# Patient Record
Sex: FEMALE | Race: WHITE | NOT HISPANIC OR LATINO | Employment: STUDENT | URBAN - METROPOLITAN AREA
[De-identification: names, ages, dates, MRNs, and addresses within clinical notes are randomized per-mention and may not be internally consistent; named-entity substitution may affect disease eponyms.]

---

## 2021-08-27 ENCOUNTER — OFFICE VISIT (OUTPATIENT)
Dept: URGENT CARE | Facility: URGENT CARE | Age: 21
End: 2021-08-27
Payer: COMMERCIAL

## 2021-08-27 VITALS
HEIGHT: 71 IN | RESPIRATION RATE: 14 BRPM | SYSTOLIC BLOOD PRESSURE: 122 MMHG | WEIGHT: 192 LBS | TEMPERATURE: 98.5 F | DIASTOLIC BLOOD PRESSURE: 70 MMHG | HEART RATE: 70 BPM | BODY MASS INDEX: 26.88 KG/M2

## 2021-08-27 DIAGNOSIS — B00.1 COLD SORE: ICD-10-CM

## 2021-08-27 DIAGNOSIS — N34.2 URETHRITIS: ICD-10-CM

## 2021-08-27 DIAGNOSIS — R30.0 DYSURIA: Primary | ICD-10-CM

## 2021-08-27 LAB
ALBUMIN UR-MCNC: 30 MG/DL
APPEARANCE UR: CLEAR
BILIRUB UR QL STRIP: ABNORMAL
COLOR UR AUTO: YELLOW
GLUCOSE UR STRIP-MCNC: NEGATIVE MG/DL
HGB UR QL STRIP: ABNORMAL
KETONES UR STRIP-MCNC: ABNORMAL MG/DL
LEUKOCYTE ESTERASE UR QL STRIP: NEGATIVE
NITRATE UR QL: NEGATIVE
PH UR STRIP: 5 [PH] (ref 5–7)
RBC #/AREA URNS AUTO: NORMAL /HPF
SP GR UR STRIP: >=1.03 (ref 1–1.03)
UROBILINOGEN UR STRIP-ACNC: 0.2 E.U./DL
WBC #/AREA URNS AUTO: NORMAL /HPF

## 2021-08-27 PROCEDURE — 81001 URINALYSIS AUTO W/SCOPE: CPT | Performed by: FAMILY MEDICINE

## 2021-08-27 PROCEDURE — 99203 OFFICE O/P NEW LOW 30 MIN: CPT | Mod: 25 | Performed by: FAMILY MEDICINE

## 2021-08-27 PROCEDURE — 96372 THER/PROPH/DIAG INJ SC/IM: CPT | Performed by: FAMILY MEDICINE

## 2021-08-27 PROCEDURE — 87491 CHLMYD TRACH DNA AMP PROBE: CPT | Performed by: FAMILY MEDICINE

## 2021-08-27 PROCEDURE — 87529 HSV DNA AMP PROBE: CPT | Performed by: FAMILY MEDICINE

## 2021-08-27 PROCEDURE — 87591 N.GONORRHOEAE DNA AMP PROB: CPT | Performed by: FAMILY MEDICINE

## 2021-08-27 RX ORDER — VALACYCLOVIR HYDROCHLORIDE 1 G/1
1000 TABLET, FILM COATED ORAL 2 TIMES DAILY
Qty: 20 TABLET | Refills: 0 | Status: SHIPPED | OUTPATIENT
Start: 2021-08-27 | End: 2022-08-04

## 2021-08-27 RX ORDER — ESTRADIOL 0.1 MG/D
PATCH TRANSDERMAL
COMMUNITY
Start: 2020-10-28 | End: 2022-08-04

## 2021-08-27 RX ORDER — TRAZODONE HYDROCHLORIDE 50 MG/1
TABLET, FILM COATED ORAL
COMMUNITY
Start: 2021-07-29 | End: 2022-08-04

## 2021-08-27 RX ORDER — AZITHROMYCIN 500 MG/1
1000 TABLET, FILM COATED ORAL DAILY
Qty: 2 TABLET | Refills: 0 | Status: SHIPPED | OUTPATIENT
Start: 2021-08-27 | End: 2021-08-28

## 2021-08-27 RX ORDER — DULOXETIN HYDROCHLORIDE 30 MG/1
CAPSULE, DELAYED RELEASE ORAL
COMMUNITY
Start: 2021-08-27 | End: 2022-08-04

## 2021-08-27 ASSESSMENT — MIFFLIN-ST. JEOR: SCORE: 1732.04

## 2021-08-28 NOTE — PATIENT INSTRUCTIONS
Patient Education     Urethritis in Women   Urethritis occurs when the urethra is red and swollen (inflamed). The urethra is the tube that passes urine from the bladder to outside the body. The urethra can get swollen and cause burning pain when you urinate. You may also have pain with sex. It can cause pain in the belly (abdomen) or pelvis. A urethral or vaginal discharge may also occur.      An inflamed urethra can cause pain during urination.   What causes urethritis?  Urethritis can be caused by a bacterial or viral infection. Such an infection can lead to conditions such as a urinary tract infection (UTI) or sexually transmitted infection (STI). Urethritis can also be caused by injury or sensitivity or allergy to chemicals in lotions and other products.   How is urethritis diagnosed?  Your healthcare provider will examine you and ask about your symptoms and health history. You may also have 1 or more of the following tests:     Urine test. Urine samples are taken and checked for problems.    Blood test. A blood sample is taken and checked for problems.    Vaginal culture. A sample of vaginal discharge is taken and tested for problems. A cotton swab is inserted into the vagina.    Cystoscopy. This test lets the provider look for problems in the urinary tract. The test uses a thin, flexible telescope called a cystoscope with a light and camera attached. The scope is put into the urethra.    Ultrasound. This lets the healthcare provider see a detailed image of the inside of your pelvis. Ultrasound will not show if you have urethritis. But it may show other signs of STIs that can also cause urethritis.    Nucleic acid test (NEVIN). This can tell if you have a virus or bacteria. It may be done instead of a culture because it allows for a faster diagnosis.  How is urethritis treated?  Treatment depends on the cause of urethritis. If it s due to a bacterial infection, medicines that fight infection (antibiotics) will  be given. Your healthcare provider can tell you more about your treatment options. In the meantime, your symptoms can be treated. To relieve pain and swelling, anti-inflammatory medicines, such as ibuprofen, may be given. Untreated, symptoms may get worse. It can also cause scar tissue to form in the urethra, causing it to narrow. And it can lead to pelvic inflammatory disease.   When to call your healthcare provider   Call the healthcare provider right away if you have any of the following:     Fever of  100.4  F ( 38.0 C ) or higher, or as directed by your provider    Burning pain with urination    Belly or pelvic pain    Increased urge to urinate    Discharge from the vagina  Preventing STIs  When it comes to sex, it s important to take care and be safe. Any sexual contact with the penis, vagina, anus, or mouth can spread an STI. The only sure way to prevent STIs is not to have sex (abstinence). But there are ways to make sex safer. Use a latex condom each time you have sex. And talk with your partner about STIs before you have sex.   [employer] last reviewed this educational content on 1/1/2020 2000-2021 [employer]. All rights reserved. This information is not intended as a substitute for professional medical care. Always follow your healthcare professional's instructions.

## 2021-08-29 LAB
C TRACH DNA SPEC QL NAA+PROBE: NEGATIVE
HSV1 DNA SPEC QL NAA+PROBE: NOT DETECTED
HSV2 DNA SPEC QL NAA+PROBE: NOT DETECTED
N GONORRHOEA DNA SPEC QL NAA+PROBE: NEGATIVE

## 2021-08-30 NOTE — PROGRESS NOTES
"(R30.0) Dysuria    (N34.2) Urethritis(primary encounter diagnosis)  Comment: concerning for sti chlam/[place]. We discussed options and recommende treatment given risk.   Plan: UA Macro with Reflex to Micro and Culture - lab        collect, Chlamydia trachomatis PCR - Clinic         Collect, Neisseria gonorrhoeae PCR - Clinic         Collect, Urine Microscopic         cefTRIAXone (ROCEPHIN) injection 500 mg,         azithromycin (ZITHROMAX) 500 MG tablet         toelrated well. Aftercare discussed. Recheck in 3 months sooner if symptoms not resolving.     (B00.1) Cold sore  Comment: this appears to be a cold sore v impetigo. Doubt gonorrhea but this would be treated with the ceftriaxone.    Plan: valACYclovir (VALTREX) 1000 mg tablet, HSV         Types 1 and 2 Qualitative PCR CSF        follow up discussed.     ------------------------------------    SUBJECTIVE:   22 yo with lesion on her lip and dysuria x 24 hours.     Possible Recent exposure through oral and vag intercourse with new partner.     No fever. No swollen glands noted. No throat pain. No abd pain.     OBJECTIVE: /70   Pulse 70   Temp 98.5  F (36.9  C) (Oral)   Resp 14   Ht 1.803 m (5' 11\")   Wt 87.1 kg (192 lb)   Breastfeeding No   BMI 26.78 kg/m   no apparent distress   Lower right lip with swelling and brownish discoloration of the skin. Some suspected discrete vesicles.   No lad noted.   Oropharynx is clear otherwise.     Results for orders placed or performed in visit on 08/27/21   UA Macro with Reflex to Micro and Culture - lab collect     Status: Abnormal    Specimen: Urine, Midstream   Result Value Ref Range    Color Urine Yellow Colorless, Straw, Light Yellow, Yellow    Appearance Urine Clear Clear    Glucose Urine Negative Negative mg/dL    Bilirubin Urine Small (A) Negative    Ketones Urine Trace (A) Negative mg/dL    Specific Gravity Urine >=1.030 1.003 - 1.035    Blood Urine Moderate (A) Negative    pH Urine 5.0 5.0 - 7.0    " Protein Albumin Urine 30  (A) Negative mg/dL    Urobilinogen Urine 0.2 0.2, 1.0 E.U./dL    Nitrite Urine Negative Negative    Leukocyte Esterase Urine Negative Negative   Urine Microscopic     Status: Normal   Result Value Ref Range    RBC Urine 0-2 0-2 /HPF /HPF    WBC Urine 0-5 0-5 /HPF /HPF    Narrative    Urine Culture not indicated   Chlamydia trachomatis PCR - Clinic Collect     Status: Normal    Specimen: Vagina; Swab   Result Value Ref Range    Chlamydia trachomatis Negative Negative   Neisseria gonorrhoeae PCR - Clinic Collect     Status: Normal    Specimen: Vagina; Swab   Result Value Ref Range    Neisseria gonorrhoeae Negative Negative   HSV Types 1 and 2 Qualitative PCR CSF     Status: Normal    Specimen: Lip, Lower; Swab   Result Value Ref Range    Herpes Simplex Virus 1 DNA Not Detected Not Detected    Herpes Simplex Virus 2 DNA Not Detected Not Detected    Narrative    The [employer] Molecular Simplexa HSV 1 & 2 Direct assay on the [employer] MDX instrument is a FDA-approved, real-time PCR test for the qualitative detection and differentiation of Herpes Simplex virus Type 1 & 2 DNA from patients with signs and symptoms of HSV-1 or 2 infection.

## 2021-10-09 ENCOUNTER — HEALTH MAINTENANCE LETTER (OUTPATIENT)
Age: 21
End: 2021-10-09

## 2022-03-09 ENCOUNTER — VIRTUAL VISIT (OUTPATIENT)
Dept: URGENT CARE | Facility: CLINIC | Age: 22
End: 2022-03-09
Payer: COMMERCIAL

## 2022-03-09 DIAGNOSIS — J06.9 VIRAL UPPER RESPIRATORY TRACT INFECTION: Primary | ICD-10-CM

## 2022-03-09 PROCEDURE — 99213 OFFICE O/P EST LOW 20 MIN: CPT | Mod: 95 | Performed by: PHYSICIAN ASSISTANT

## 2022-03-09 NOTE — PROGRESS NOTES
"Maria Antonia is a 22 year old who is being evaluated via a billable video visit.      Video Start Time: 3:33 PM    Assessment & Plan     Viral upper respiratory tract infection    BMI:   Estimated body mass index is 26.78 kg/m  as calculated from the following:    Height as of 8/27/21: 1.803 m (5' 11\").    Weight as of 8/27/21: 87.1 kg (192 lb).     I will have patient treat with sudafed, Mucinex and ibuprofen and follow up if symptoms are persisting or worsening over the next 5-7 days.     Kirstie Tolliver PA-C  Virtual Urgent Care  [facility]    Melva Em is a 22 year old who presents for the following health issues : cold    HPI - Patient developed cold symptoms on Monday that have persisted. She is having sinus pressure a cough and a runny nose. She took a covid test and it was negative. She has not had fevers or chills. She has not had body aches or other acute symptoms.     Review of Systems   Constitutional, HEENT, cardiovascular, pulmonary, gi and gu systems are negative, except as otherwise noted.      Objective           Vitals:  No vitals were obtained today due to virtual visit.    Physical Exam   GENERAL: alert and no distress  EYES: Eyes grossly normal to inspection.  No discharge or erythema, or obvious scleral/conjunctival abnormalities.  HENT: normal cephalic/atraumatic, rhinorrhea clear, oropharynx clear, oral mucous membranes moist and sinuses: not tender but congested per patient  RESP: No audible wheeze, cough, or visible cyanosis.  No visible retractions or increased work of breathing.    SKIN: Visible skin clear. No significant rash, abnormal pigmentation or lesions.  NEURO: Cranial nerves grossly intact.  Mentation and speech appropriate for age.  PSYCH: Mentation appears normal, affect normal/bright, judgement and insight intact, normal speech and appearance well-groomed.        Video-Visit Details    Type of service:  Video Visit    Video End Time:3:37 " PM    Originating Location (pt. Location): Home    Distant Location (provider location):  [facility]     Platform used for Video Visit: Sumanth

## 2022-08-04 ENCOUNTER — VIRTUAL VISIT (OUTPATIENT)
Dept: FAMILY MEDICINE | Facility: CLINIC | Age: 22
End: 2022-08-04
Payer: COMMERCIAL

## 2022-08-04 DIAGNOSIS — R19.7 DIARRHEA, UNSPECIFIED TYPE: Primary | ICD-10-CM

## 2022-08-04 DIAGNOSIS — F41.1 GAD (GENERALIZED ANXIETY DISORDER): ICD-10-CM

## 2022-08-04 DIAGNOSIS — F33.1 MODERATE EPISODE OF RECURRENT MAJOR DEPRESSIVE DISORDER (H): ICD-10-CM

## 2022-08-04 DIAGNOSIS — R14.0 ABDOMINAL BLOATING: ICD-10-CM

## 2022-08-04 PROCEDURE — 99203 OFFICE O/P NEW LOW 30 MIN: CPT | Mod: 95 | Performed by: INTERNAL MEDICINE

## 2022-08-04 RX ORDER — DULOXETIN HYDROCHLORIDE 60 MG/1
60 CAPSULE, DELAYED RELEASE ORAL DAILY
Qty: 60 CAPSULE | Refills: 1 | Status: SHIPPED | OUTPATIENT
Start: 2022-08-04 | End: 2022-09-12

## 2022-08-04 NOTE — PATIENT INSTRUCTIONS
[phone] for lab appointment.    Blood work ordered.     Reading material for bloating. Avoid all dairy products.     Probiotics - whole foods    Stomach doctor referral     Mental health referral

## 2022-08-04 NOTE — PROGRESS NOTES
Maria Antonia is a 22 year old who is being evaluated via a billable video visit.      How would you like to obtain your AVS? MyChart  If the video visit is dropped, the invitation should be resent by: Text to cell phone: [phone]  Will anyone else be joining your video visit? No          Assessment & Plan     Diarrhea, unspecified type  Restrict dairy, other foods that cause bloating, start taking probiotics. Refer to GI. She is concerned about Celiac disease, testing ordered. Other basic lab work ordered.   - CBC with Platelets (Today); Future  - COMPREHENSIVE METABOLIC PANEL; Future  - IgA [LAB73]; Future  - Deamidated Giladin Peptide Agata IgA IgG [HML0651]; Future  - Tissue transglutaminase agata IgA and IgG [YIK4184]; Future  - Endomysial Antibody IgA by IFA [[id number]]; Future  - Adult GI  Referral - Consult Only; Future    Abdominal bloating  - Adult GI  Referral - Consult Only; Future    Moderate episode of recurrent major depressive disorder (H)  Resume Cymbalta 60 mg daily. Will follow up in 6 weeks.  - Adult Mental Health  Referral; Future  - DULoxetine (CYMBALTA) 60 MG capsule; Take 1 capsule (60 mg) by mouth daily    TRISHA (generalized anxiety disorder)  - Adult Mental Health  Referral; Future  - DULoxetine (CYMBALTA) 60 MG capsule; Take 1 capsule (60 mg) by mouth daily    See Patient Instructions    Return in about 4 weeks (around 9/1/2022) for Follow up, with me.    MARYANNE ESPINOZA MD  [facility]    Melva Em is a 22 year old, presenting for the following health issues:  No chief complaint on file.    Constipation, bloating, diarrhea, irregular bowel movement, acne on chin and back, migraine and brain fog. Irregular periods. She had IUD in the past, recently removed. She goes to [facility].     Sister and cousin allergic to fructose   Cousin has Celiac disease  Sister has ORACIO    She has depression and anxiety. She was on cymbalta in the past which  worked for her and would like to go back on it.       History of Present Illness       Reason for visit:  New primary provider    She eats 4 or more servings of fruits and vegetables daily.She consumes 0 sweetened beverage(s) daily.She exercises with enough effort to increase her heart rate 30 to 60 minutes per day.  She exercises with enough effort to increase her heart rate 6 days per week.   She is taking medications regularly.       Review of Systems       Objective       Vitals:  No vitals were obtained today due to virtual visit.    Physical Exam   GEN: No acute distress  RESP: No audible increased work of breathing. Patient speaking in full sentences without distress.  PSYCH: pleasant  Exam otherwise limited due to virtual platform        Video-Visit Details    Video Start Time: 2:30 pm    Type of service:  Video Visit    Video End Time:2:49 pm    Originating Location (pt. Location): Home    Distant Location (provider location):  [facility]     Platform used for Video Visit: [employer]  Margaret.

## 2022-08-11 ENCOUNTER — LAB (OUTPATIENT)
Dept: LAB | Facility: CLINIC | Age: 22
End: 2022-08-11
Payer: COMMERCIAL

## 2022-08-11 DIAGNOSIS — R19.7 DIARRHEA, UNSPECIFIED TYPE: ICD-10-CM

## 2022-08-11 LAB
ALBUMIN SERPL-MCNC: 4.5 G/DL (ref 3.4–5)
ALP SERPL-CCNC: 45 U/L (ref 40–150)
ALT SERPL W P-5'-P-CCNC: 16 U/L (ref 0–50)
ANION GAP SERPL CALCULATED.3IONS-SCNC: 7 MMOL/L (ref 3–14)
AST SERPL W P-5'-P-CCNC: 15 U/L (ref 0–45)
BILIRUB SERPL-MCNC: 1.2 MG/DL (ref 0.2–1.3)
BUN SERPL-MCNC: 17 MG/DL (ref 7–30)
CALCIUM SERPL-MCNC: 9 MG/DL (ref 8.5–10.1)
CHLORIDE BLD-SCNC: 106 MMOL/L (ref 94–109)
CO2 SERPL-SCNC: 25 MMOL/L (ref 20–32)
CREAT SERPL-MCNC: 0.87 MG/DL (ref 0.52–1.04)
ERYTHROCYTE [DISTWIDTH] IN BLOOD BY AUTOMATED COUNT: 11.6 % (ref 10–15)
GFR SERPL CREATININE-BSD FRML MDRD: >90 ML/MIN/1.73M2
GLIADIN IGA SER-ACNC: 0.5 U/ML
GLIADIN IGG SER-ACNC: <0.6 U/ML
GLUCOSE BLD-MCNC: 91 MG/DL (ref 70–99)
HCT VFR BLD AUTO: 41.7 % (ref 35–47)
HGB BLD-MCNC: 14.3 G/DL (ref 11.7–15.7)
MCH RBC QN AUTO: 29.2 PG (ref 26.5–33)
MCHC RBC AUTO-ENTMCNC: 34.3 G/DL (ref 31.5–36.5)
MCV RBC AUTO: 85 FL (ref 78–100)
PLATELET # BLD AUTO: 225 10E3/UL (ref 150–450)
POTASSIUM BLD-SCNC: 4 MMOL/L (ref 3.4–5.3)
PROT SERPL-MCNC: 7.2 G/DL (ref 6.8–8.8)
RBC # BLD AUTO: 4.9 10E6/UL (ref 3.8–5.2)
SODIUM SERPL-SCNC: 138 MMOL/L (ref 133–144)
TTG IGA SER-ACNC: <0.2 U/ML
TTG IGG SER-ACNC: <0.6 U/ML
WBC # BLD AUTO: 5.1 10E3/UL (ref 4–11)

## 2022-08-11 PROCEDURE — 86231 EMA EACH IG CLASS: CPT | Mod: 90

## 2022-08-11 PROCEDURE — 85027 COMPLETE CBC AUTOMATED: CPT

## 2022-08-11 PROCEDURE — 86258 DGP ANTIBODY EACH IG CLASS: CPT

## 2022-08-11 PROCEDURE — 86364 TISS TRNSGLTMNASE EA IG CLAS: CPT

## 2022-08-11 PROCEDURE — 99000 SPECIMEN HANDLING OFFICE-LAB: CPT

## 2022-08-11 PROCEDURE — 36415 COLL VENOUS BLD VENIPUNCTURE: CPT

## 2022-08-11 PROCEDURE — 80053 COMPREHEN METABOLIC PANEL: CPT

## 2022-08-11 PROCEDURE — 82784 ASSAY IGA/IGD/IGG/IGM EACH: CPT

## 2022-08-12 LAB
ENDOMYSIUM IGA TITR SER IF: NORMAL {TITER}
IGA SERPL-MCNC: 101 MG/DL (ref 84–499)

## 2022-09-12 ENCOUNTER — VIRTUAL VISIT (OUTPATIENT)
Dept: FAMILY MEDICINE | Facility: CLINIC | Age: 22
End: 2022-09-12
Payer: COMMERCIAL

## 2022-09-12 DIAGNOSIS — F33.1 MODERATE EPISODE OF RECURRENT MAJOR DEPRESSIVE DISORDER (H): Primary | ICD-10-CM

## 2022-09-12 DIAGNOSIS — F41.1 GAD (GENERALIZED ANXIETY DISORDER): ICD-10-CM

## 2022-09-12 PROCEDURE — 99213 OFFICE O/P EST LOW 20 MIN: CPT | Mod: 95 | Performed by: INTERNAL MEDICINE

## 2022-09-12 RX ORDER — SERTRALINE HYDROCHLORIDE 25 MG/1
25 TABLET, FILM COATED ORAL DAILY
Qty: 60 TABLET | Refills: 0 | Status: SHIPPED | OUTPATIENT
Start: 2022-09-12 | End: 2022-10-12

## 2022-09-12 ASSESSMENT — ANXIETY QUESTIONNAIRES
2. NOT BEING ABLE TO STOP OR CONTROL WORRYING: NOT AT ALL
GAD7 TOTAL SCORE: 2
7. FEELING AFRAID AS IF SOMETHING AWFUL MIGHT HAPPEN: NOT AT ALL
8. IF YOU CHECKED OFF ANY PROBLEMS, HOW DIFFICULT HAVE THESE MADE IT FOR YOU TO DO YOUR WORK, TAKE CARE OF THINGS AT HOME, OR GET ALONG WITH OTHER PEOPLE?: SOMEWHAT DIFFICULT
4. TROUBLE RELAXING: NOT AT ALL
7. FEELING AFRAID AS IF SOMETHING AWFUL MIGHT HAPPEN: NOT AT ALL
GAD7 TOTAL SCORE: 2
3. WORRYING TOO MUCH ABOUT DIFFERENT THINGS: NOT AT ALL
IF YOU CHECKED OFF ANY PROBLEMS ON THIS QUESTIONNAIRE, HOW DIFFICULT HAVE THESE PROBLEMS MADE IT FOR YOU TO DO YOUR WORK, TAKE CARE OF THINGS AT HOME, OR GET ALONG WITH OTHER PEOPLE: SOMEWHAT DIFFICULT
1. FEELING NERVOUS, ANXIOUS, OR ON EDGE: SEVERAL DAYS
5. BEING SO RESTLESS THAT IT IS HARD TO SIT STILL: NOT AT ALL
6. BECOMING EASILY ANNOYED OR IRRITABLE: SEVERAL DAYS
GAD7 TOTAL SCORE: 2

## 2022-09-12 ASSESSMENT — PATIENT HEALTH QUESTIONNAIRE - PHQ9
SUM OF ALL RESPONSES TO PHQ QUESTIONS 1-9: 5
SUM OF ALL RESPONSES TO PHQ QUESTIONS 1-9: 5
10. IF YOU CHECKED OFF ANY PROBLEMS, HOW DIFFICULT HAVE THESE PROBLEMS MADE IT FOR YOU TO DO YOUR WORK, TAKE CARE OF THINGS AT HOME, OR GET ALONG WITH OTHER PEOPLE: SOMEWHAT DIFFICULT

## 2022-09-12 NOTE — PROGRESS NOTES
Maria Antonia is a 22 year old who is being evaluated via a billable video visit.      How would you like to obtain your AVS? MyChart  If the video visit is dropped, the invitation should be resent by: Text to cell phone: [phone]  Will anyone else be joining your video visit? No          Assessment & Plan     Moderate episode of recurrent major depressive disorder (H)  Stable. Start zoloft 25 mg daily.   Discontinue duloxetine  - sertraline (ZOLOFT) 25 MG tablet; Take 1 tablet (25 mg) by mouth daily    TRISHA (generalized anxiety disorder)  - sertraline (ZOLOFT) 25 MG tablet; Take 1 tablet (25 mg) by mouth daily     See Patient Instructions    Return in about 4 weeks (around 10/10/2022) for Follow up, with me, using a phone visit, using a video visit.    MARYANNE ESPINOZA MD  [facility]   Maria Antonia is a 22 year old, presenting for the following health issues:  Recheck Medication (Cymbalta medication follow up)    Maria Antonia was started on Duloxetine 60 mg daily 2 months ago for MDD and TRISHA.   She had taken this medication in the past and wanted to resume it.    Today she reports side effects including fatigue and nightmares.   She wants to stop this medication.   She has tried Lexapro in the past but not able to tolerate it.  She denies suicidal ideas.     In the last visit she also had diarrhea which has now resolved after restricting dairy.   She had constipation and took senna.        History of Present Illness       Mental Health Follow-up:  Patient presents to follow-up on Depression & Anxiety.Patient's depression since last visit has been:  Medium  The patient is not having other symptoms associated with depression.  Patient's anxiety since last visit has been:  Medium  The patient is not having other symptoms associated with anxiety.  Any significant life events: No  Patient is not feeling anxious or having panic attacks.  Patient has no concerns about alcohol or drug use.    She eats 2-3  "servings of fruits and vegetables daily.She consumes 0 sweetened beverage(s) daily.She exercises with enough effort to increase her heart rate 10 to 19 minutes per day.  She exercises with enough effort to increase her heart rate 5 days per week. She is missing 2 dose(s) of medications per week.  She is not taking prescribed medications regularly due to remembering to take.    Today's PHQ-9         PHQ-9 Total Score: 5    PHQ-9 Q9 Thoughts of better off dead/self-harm past 2 weeks :   Not at all    How difficult have these problems made it for you to do your work, take care of things at home, or get along with other people: Somewhat difficult  Today's TRISHA-7 Score: 2       Medication Followup of Cymbalta    Taking Medication as prescribed: yes    Side Effects:  Trouble sleeping through the night    Medication Helping Symptoms:  NO        Review of Systems       Objective    Vitals - Patient Reported  Weight (Patient Reported): 86.2 kg (190 lb)  Height (Patient Reported): 180.3 cm (5' 11\")  BMI (Based on Pt Reported Ht/Wt): 26.5    Physical Exam           Video-Visit Details    Video Start Time: 11:50 am    Type of service:  Video Visit    Video End Time:12:01 pm    Originating Location (pt. Location): Home    Distant Location (provider location):  [facility]     Platform used for Video Visit: Sumanth    "

## 2022-09-17 ENCOUNTER — HEALTH MAINTENANCE LETTER (OUTPATIENT)
Age: 22
End: 2022-09-17

## 2022-09-26 NOTE — TELEPHONE ENCOUNTER
REFERRAL INFORMATION:    Referring Provider: Dr. Francisca Mendez     Referring [facility]:  [facility]     Reason for Visit/Diagnosis: Diarrhea , Abdominal bloating     FUTURE VISIT INFORMATION:    Appointment Date: 10/4/2022    Appointment Time: 1 PM      NOTES STATUS DETAILS   OFFICE NOTE from Referring Provider Internal 9/12/2022, 8/4/2022 Office visit with Dr. Mendze      OFFICE NOTE from Other Specialist N/A    HOSPITAL DISCHARGE SUMMARY/  ED VISITS N/A    OPERATIVE REPORT N/A    MEDICATION LIST Internal         ENDOSCOPY  N/A    COLONOSCOPY N/A    ERCP N/A    EUS N/A    STOOL TESTING N/A    PERTINENT LABS Internal/ Care Everywhere    PATHOLOGY REPORTS (RELATED) N/A    IMAGING (CT, MRI, EGD, MRCP, Small Bowel Follow Through/SBT, MR/CT Enterography) N/A

## 2022-10-04 ENCOUNTER — PRE VISIT (OUTPATIENT)
Dept: GASTROENTEROLOGY | Facility: CLINIC | Age: 22
End: 2022-10-04

## 2022-10-12 ENCOUNTER — VIRTUAL VISIT (OUTPATIENT)
Dept: BEHAVIORAL HEALTH | Facility: CLINIC | Age: 22
End: 2022-10-12
Payer: COMMERCIAL

## 2022-10-12 ENCOUNTER — VIRTUAL VISIT (OUTPATIENT)
Dept: PSYCHIATRY | Facility: CLINIC | Age: 22
End: 2022-10-12
Attending: INTERNAL MEDICINE
Payer: COMMERCIAL

## 2022-10-12 DIAGNOSIS — F41.1 GAD (GENERALIZED ANXIETY DISORDER): ICD-10-CM

## 2022-10-12 DIAGNOSIS — F32.4 MAJOR DEPRESSIVE DISORDER WITH SINGLE EPISODE, IN PARTIAL REMISSION (H): ICD-10-CM

## 2022-10-12 DIAGNOSIS — F33.1 MODERATE EPISODE OF RECURRENT MAJOR DEPRESSIVE DISORDER (H): ICD-10-CM

## 2022-10-12 DIAGNOSIS — F41.1 GAD (GENERALIZED ANXIETY DISORDER): Primary | ICD-10-CM

## 2022-10-12 PROCEDURE — 90791 PSYCH DIAGNOSTIC EVALUATION: CPT | Mod: 52

## 2022-10-12 PROCEDURE — 99205 OFFICE O/P NEW HI 60 MIN: CPT | Mod: 95 | Performed by: NURSE PRACTITIONER

## 2022-10-12 RX ORDER — SERTRALINE HYDROCHLORIDE 25 MG/1
50 TABLET, FILM COATED ORAL DAILY
Qty: 30 TABLET | Refills: 0 | Status: SHIPPED | OUTPATIENT
Start: 2022-10-12 | End: 2022-11-15

## 2022-10-12 ASSESSMENT — COLUMBIA-SUICIDE SEVERITY RATING SCALE - C-SSRS
2. HAVE YOU ACTUALLY HAD ANY THOUGHTS OF KILLING YOURSELF?: NO
1. HAVE YOU WISHED YOU WERE DEAD OR WISHED YOU COULD GO TO SLEEP AND NOT WAKE UP?: NO
TOTAL  NUMBER OF INTERRUPTED ATTEMPTS LIFETIME: NO
6. HAVE YOU EVER DONE ANYTHING, STARTED TO DO ANYTHING, OR PREPARED TO DO ANYTHING TO END YOUR LIFE?: NO
ATTEMPT LIFETIME: NO
TOTAL  NUMBER OF ABORTED OR SELF INTERRUPTED ATTEMPTS LIFETIME: NO

## 2022-10-12 NOTE — PROGRESS NOTES
PSYCHIATRIC DIAGNOSTIC ASSESSMENT      Name:  Maria Antonia Marr  : 2000    Maria Antonia is a 22 year old who is being evaluated via a billable video visit.      How would you like to obtain your AVS? MyChart  If the video visit is dropped, the invitation should be resent by: Text to cell phone: [phone]  Will anyone else be joining your video visit? No     Telemedicine Visit: The patient's condition can be safely assessed and treated via synchronous audio and visual telemedicine encounter.      Reason for Telemedicine Visit: COVID 19 pandemic and the social and physical recommendations by the CDC and [facility].      Originating Site (Patient Location): Patient's home    Distant Site (Provider Location): Provider Remote Setting    Consent:  The patient/guardian has verbally consented to: the potential risks and benefits of telemedicine (video visit or phone) versus in person care; bill my insurance or make self-payment for services provided; and responsibility for payment of non-covered services.     Mode of Communication:  Hired video platform     As the provider I attest to compliance with applicable laws and regulations related to telemedicine.    IDENTIFICATION   Referred by: Maryanne Mendez MD [facility]   Patient Care Team:  Maryanne Mendez MD as PCP - General (Internal Medicine)  Maryanne Mendez MD as Assigned PCP  Jitendra Greene MD as Fellow  Therapist: currently [facility]    History was provided by patient  who were  good historian(s).    Patient attended the session alone    RECORDS AVAILABLE FOR REVIEW: EHR records through [employer] .  In addition, reviewed the assessment today completed by Janis Childers, BEHAVIORAL HEALTH CONSULTANT                                                CHIEF COMPLAINT   Patient is a 22 year old,  White Not  or  female  who presents for initial psychiatric evaluation. Referred by   their Primary Care Provider: MARYANNE MENDEZ MD to the [facility]  "[facility] Service ([facility]) for evaluation of depression and anxiety.  Our psychiatry providers act as a specialty service for Primary Care Providers in the [facility] who seek to optimize medications for unstable patients.  Once medications have been optimized, our providers discharge the patient back to the referring Primary Care Provider for ongoing medication management.  This type of system allows our providers to serve a high volume of patients.      Note by PCP day of referral:  \"depression andf anxiety\"    HISTORY OF PRESENT ILLNESS   Per [facility], Janis Childers, during today's team-based visit:  \"Stress: School and work. 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 Work at [employer], as a . Working full-time. Feel like work load and school are affecting mental health symptoms. Concerned that medications are not effective at this time. Taking sertraline currently, have been on for a month, reports that she was supposed to increase dose, but is experiencing migraines. Has previously tried lexapro, duloxetine. Pt endorses having GI distress that she is seeing primary-care and referral to GI specialist.  Goal: Symptom reduction including addressing lack of motivation and negative self-talk. Increase in energy. Reports feeling fatigued and would like address sleep.  Most important: Addressing medication concerns and possible side effects (headaches). Would like to be on as little medication as possible.\"    Reports past diagnosis of the following in July 2021 psych evaluation with [facility] with a diagnosis of Generalized Anxiety Disorder, Major Depressive Disorder, unspecified eating disorder.  First sought treatment 2018 and does note depression is worse in the winter, seasonal component.    Pt endorses that she has experienced depression and anxiety symptoms since the start of college. Pt endorses that her school and work load are affecting her mental " health and exacerbating symptoms.       PSYCHIATRIC HISTORY:   Previous psychiatry: denies   Previous therapist: currently  History of Psychiatric Hospitalizations:   - Inpatient: denies    - IOP/PHP/Day treatment: denies    History of Suicidal Ideation: denies   History of Suicide Attempts:  Denies     History of Self-injurious Behavior: denies   History of Violence/Aggression: denies    History of Commitment?  Denies    Electroconvulsive Therapy (ECT) or Transcranial Magnetic Stimulation (TMS): denies    PharmacogenomicTesting (such as GeneSight): denies     PSYCHIATRIC REVIEW OF SYSTEMS:   Sleep: getting approximately 8 hours of sleep, nonrestorative       Depression:    Change in sleep, Lack of interest, Change in energy level and Feeling sad, down, or depressed  Es:        No Symptoms  Psychosis:    No Symptoms  Anxiety:    Excessive worry, Nervousness, Physical complaints, such as headaches, stomachaches, muscle tension, Sleep disturbance, Poor concentration and Irritability  Panic:        No symptoms  Post Traumatic Stress Disorder:  Denies experiencing or witnessing an event considered traumatic.    Eating Disorder:    Restriction and purging.  This is improved after outpatient treatment at private practice in [place].  Working on body image in therapy.  ADD / ADHD:        No symptoms  Conduct Disorder:    No symptoms  Autism Spectrum Disorder:    No symptoms  Obsessive Compulsive Disorder:    No Symptoms    ASSESSMENT SCALES:  PHQ-9 SCORE 9/12/2022   PHQ-9 Total Score MyChart 5 (Mild depression)   PHQ-9 Total Score 5       Last PHQ-9 9/12/2022   1.  Little interest or pleasure in doing things 1   2.  Feeling down, depressed, or hopeless 1   3.  Trouble falling or staying asleep, or sleeping too much 0   4.  Feeling tired or having little energy 1   5.  Poor appetite or overeating 1   6.  Feeling bad about yourself 0   7.  Trouble concentrating 1   8.  Moving slowly or restless 0   Q9: Thoughts of  better off dead/self-harm past 2 weeks 0   PHQ-9 Total Score 5     PHQ9 score is 5 indicating minimal depression.  Suicidal ideation:  Denies    TRISHA-7 SCORE 9/12/2022   Total Score 2 (minimal anxiety)   Total Score 2     TRISHA-7   [employer], 2002; Used with Permission) 9/12/2022   1. Feeling nervous, anxious, or on edge Several days   2. Not being able to stop or control worrying Not at all   3. Worrying too much about different things Not at all   4. Trouble relaxing Not at all   5. Being so restless that it is hard to sit still Not at all   6. Becoming easily annoyed or irritable Several days   7. Feeling afraid, as if something awful might happen Not at all   TRISHA 7 TOTAL SCORE 2 (minimal anxiety)   1. Feeling nervous, anxious, or on edge 1   2. Not being able to stop or control worrying 0   3. Worrying too much about different things 0   4. Trouble relaxing 0   5. Being so restless that it is hard to sit still 0   6. Becoming easily annoyed or irritable 1   7. Feeling afraid, as if something awful might happen 0   TRISHA-7 Total Score 2   If you checked any problems, how difficult have they made it for you to do your work, take care of things at home, or get along with other people? Somewhat difficult     GAD7 score is 2    A 12-item WHODAS 2.0 assessment was completed by the patient today and recorded in [employer].  No flowsheet data found.    All other ROS negative.     FAMILY, MEDICAL, SURGICAL HISTORY REVIEWED.  MEDICATION HAVE BEEN REVIEWED AND ARE CURRENT TO THE BEST OF MY KNOWLEDGE AND ABILITY.  Student at [employer], , Studying counseling psychology at [employer] and reports being a full-time student. Pt also endorses have a full-time job at [employer], as a      [employer],     MEDICATIONS                                                                                                Current Outpatient Medications   Medication Sig     sertraline  "(ZOLOFT) 25 MG tablet Take 1 tablet (25 mg) by mouth daily     No current facility-administered medications for this visit.       DRUG MONITORING:  [place] Prescription Monitoring Program evaluating controlled substances in the last year in [place]:  [place] review was not needed today..    CURRENT MEDICATION SIDE EFFECTS REPORTED:  See below    NOTES ABOUT CURRENT PSYCHOTROPIC MEDICATIONS:   Sertraline 25 mg, migraines more often,      PAST PSYCHOTROPIC MEDICATIONS:  Duloxetine, restless sleep, vivid dreams  Escitalopram, not effective  Trazodone   Hydroxyzine, \"zombie\"    VITALS   There were no vitals taken for this visit.     BP Readings from Last 1 Encounters:   08/27/21 122/70     Pulse Readings from Last 1 Encounters:   08/27/21 70     Wt Readings from Last 1 Encounters:   08/27/21 87.1 kg (192 lb)     Ht Readings from Last 1 Encounters:   08/27/21 1.803 m (5' 11\")     Estimated body mass index is 26.78 kg/m  as calculated from the following:    Height as of 8/27/21: 1.803 m (5' 11\").    Weight as of 8/27/21: 87.1 kg (192 lb).      PERTINENT HISTORY     Patient Active Problem List   Diagnosis     Diarrhea, unspecified type     Abdominal bloating     Moderate episode of recurrent major depressive disorder (H)     TRISHA (generalized anxiety disorder)        Seizures or Head Injury: Denies history of head injury. Denies history of seizures.     History reviewed. No pertinent surgical history.     SOCIAL HISTORY       Relationship status: single  Children: denies   Highest education level was some college.    Service: denies   Employment status: full time     Trauma history: Denies  ACES (Adverse Childhood Experiences):  None.  Grew up in an intact home with all basic needs being met       LEGAL:  Denies      SUBSTANCE USE HISTORY  Social History     Tobacco Use     Smoking status: Never     Smokeless tobacco: Never   Substance Use Topics     Alcohol use: Yes     Comment: 1-2 " drinks per week       Caffeine:  Unremarkable   Alcohol:  socially  Other substance use: denies   Past use alcohol/substance use: denies      Chemical dependency history: Patient has not received chemical dependency treatment in the past       Family History   Problem Relation Age of Onset     Hypertension Mother      Maturity Onset Diabetes Mellitus in Young (ORACIO) Sister             PERTINENT FAMILY PSYCHIATRIC HISTORY NOTES  sister has severe anxiety, Mother has undiagnosed depression and anxiety. Maternal grandparents also have undiagnosed depression and anxiety. Concerned grandfather may have OCD. Paternal grandparents have hx of alcohol abuse. Paternal grandmother was hospitalized for bipolar disorder when father was a child.       Based on the clinical interview, there  are not indications of drug or alcohol abuse.      LABS & IMAGING                                                                                                                   Recent Labs   Lab Test 08/11/22  0905   WBC 5.1   HGB 14.3   HCT 41.7   MCV 85        Recent Labs   Lab Test 08/11/22  0905      POTASSIUM 4.0   CHLORIDE 106   CO2 25   GLC 91   NURY 9.0   BUN 17   CR 0.87   GFRESTIMATED >90   ALBUMIN 4.5   PROTTOTAL 7.2   AST 15   ALT 16   ALKPHOS 45   BILITOTAL 1.2     No lab results found.  No lab results found.  No results found for: SGK863, PNMV883, MCSO18VDUWN, VITD3, D2VIT, D3VIT, DTOT, [id number], [id number], [id number], [id number], [id number], [id number]     ALLERGY & IMMUNIZATIONS     No Known Allergies        MEDICAL REVIEW OF SYSTEMS:   Ten system review was completed with pertinent positives noted above    MENTAL STATUS EXAM:   General/Constitutional:  Appearance:   awake, alert, adequately groomed, appeared stated age and no apparent distress  Attitude:    cooperative   Eye Contact:  good  Musculoskeletal:  Psychomotor Behavior:  no evidence of tardive dyskinesia, dystonia, or tics from the head  up  Psychiatric:  Speech:  clear, coherent, regular rate, rhythm, and volume,   No pressure speech noted.  Associations:  no loose associations  Thought Process:   logical, linear and goal oriented  Thought Content:    No evidence of suicidal ideation or homicidal ideation, no evidence of psychotic thought, no auditory hallucinations present and no visual hallucinations present  Mood:  angry and good  Affect:  full range/stable (normal variation of emotions during exam) and was congruent to speech content.  Insight:  good  Judgment:  intact, adequate for safety  Impulse Control:  intact  Neurological:  Oriented to:  person, place, time, and situation  Attention Span and Concentration:  Able to attend to the interview      Language: intact     Recent and Remote Memory:  Intact to interview. Not formally assessed. No amnesia.    Fund of Knowledge: appropriate       SAFETY   Feels safe in home: YES     Suicidal ideation: Denies  History of suicide attempts:  No   Hx of impulsivity: No   Hope for the future: present    Hx of Command hallucinations or current psychosis: None endorsed    History of Self-injurious behaviors:    Family member  by suicide:  not discussed      SAFETY ASSESSMENT:   Based on all available evidence including the factors cited above, overall Risk for harm is low and is appropriate for outpatient level of care.   Recommended that patient call 911 or go to the local ED should there be a change in any of these risk factors.         LANGUAGE OR COMMUNICATION BARRIERS   Are there language or communication issues or need for modification in treatment? NO     Are there ethnic, cultural or Episcopalian factors that may be relevant for therapy? NO      Client identified their preferred language to be fluent English in conversational context  Does the client need the assistance of an  or other support involved in therapy? NO        DSM 5 DIAGNOSIS:   296.32 (F33.1) Major Depressive Disorder,  Recurrent Episode, Moderate _  300.02 (F41.1) Generalized Anxiety Disorder      MEDICAL COMORBIDITY IMPACTING CLINICAL PICTURE:  Denies .      ASSESSMENT AND PLAN    Maria Antonia Marr is a 22 year old White Not  or  female presenting for psychiatric evaluation and medication management through the Collaborative Care Psychiatry Services.  Information is obtained from patient and available records.  Reports history of depression, anxiety and unspecified eating disorder (restricting and purging).  Denies prior psychiatric hospitalizations. No history of suicidal thoughts or attempts. No history of self-injurious behaviors. Genetically loaded for  depression, anxiety and remote history of bipolar. Grew up in an intact home with all basic needs being met.       Problem List Items Addressed This Visit        Behavioral     Many medication trials.  Currently on low dose of sertraline and possible increase in headaches.  She would like to continue current dose after we discussed utilizing [employer], a neuropsychiatric pharmacogenomics and pharmacokinetics test to use in clinical decision making. Directed to call customer service to evaluate the financial responsibility they may have at [phone] or [email or web address].   Order placed via their portal to send the kit to the patient should they decide to go forward.    [email or web address]    Since a seasonal component, Look at SAD lamp next visit    Follow-up 4 weeks          Moderate episode of recurrent major depressive disorder (H)    Relevant Medications    sertraline (ZOLOFT) 25 MG tablet    TRISHA (generalized anxiety disorder)    Relevant Medications    sertraline (ZOLOFT) 25 MG tablet          CONSULTS/REFERRALS:   Continue therapy   Coordinate care with therapist as needed    MEDICAL:   None at this time  Coordinate care with PCP (Francisca Mendez) as needed  Follow up with primary care provider as planned or for acute  medical concerns.    PSYCHOEDUCATION:  Medication side effects and alternatives reviewed. Health promotion activities recommended and reviewed today. All questions addressed. Education and counseling completed regarding risks and benefits of medications and psychotherapy options.  Consent provided by patient/guardian  Call the psychiatric nurse line with medication questions or concerns at [phone].  VideoIQhart may be used to communicate with your provider, but this is not intended to be used for emergencies.  [employer]ios[employer].gov is information for patients.  It is run by the [employer] of [employer] and it contains information about all disorders, diseases and all medications.      COMMUNITY RESOURCES:    CRISIS NUMBERS: Provided in AVS 10/12/2022  National Suicide Prevention Lifeline: 0-982-953-TALK (555-969-4949)  [employer]/resources for a list of additional resources (SOS)            [facility] - [phone]   Urgent Care Adult Mental Zcjazt-[phone] mobile unit/  crisis line  [facility] -[phone]   COPE  [place] Mobile Team -[phone] (adults)/ [phone] (child)  Poison Control Center - 1-588.508.8796    OR  go to nearest ER  Crisis Text Line for any crisis  send this-   To: 510096   [facility] ([facility]  [phone]  National Suicide Prevention Lifeline: 741.329.6139 (TTY: 417.252.2327). Call anytime for help.  (www.suicidepreventionlifeline.org)  National [place] on Mental Illness (www.victorino.org): 578.428.3473 or 540-232-4968.   Mental Health Association (www.mentalhealth.org): 862.746.2187 or 421-190-1421.  [place] Crisis Text Line: Text [place] to 598540  Suicide LifeLine Chat: suicideInHomeVest.org/chat    ADMINISTRATIVE BILLIN min spent on the date of the encounter in chart review, patient visit, review of tests, documentation, care coordination, and/or discussion with other providers about the  issues documented above.    Video/Phone Start Time:  0730  Video/Phone End Time:  0818    Greater than 50% of time was spent in counseling and coordination of care regarding above diagnoses and treatment plan.     Patient Status:  Our psychiatry providers act as a specialty service for Primary Care Providers in the [facility] that seek to optimize medications for unstable patients.  Once medications have been optimized, our providers discharge the patient back to the referring Primary Care Provider for ongoing medication management.  This type of system allows our providers to serve a high volume of patients. At this time  Patient will continue to be seen for ongoing consultation and stabilization.    Signed:   Jami Lee, DOE, APRN, FMPEDROP-[facility] Care Psychiat[facility] Service (CCPS)   Chart documentation done in part with Dragon Voice Recognition software.  Although reviewed after completion, some word and grammatical errors may remain.
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
## 2022-10-12 NOTE — PATIENT INSTRUCTIONS
**For crisis resources, please see the information at the end of this document**     Thank you for coming to the [facility] [facility].    TREATMENT PLAN:    MEDICATIONS:   - continue sertraline and increase to 50 mg to evaluate if increase correlates with headache   Discussed utilizing [employer], a neuropsychiatric pharmacogenomics and pharmacokinetics test to use in clinical decision making. Please call customer service to evaluate the financial responsibility they may have at [phone] or support@[facility].   Order placed via their portal to send the kit to to you should you decide to go forward.    [email or web address]       CONSULTS/REFERRALS:   Continue therapy     LABS/PROCEDURES: none at present   Please call your [place] clinic and ask for a lab only appointment at your earliest convenience.  If your results are reassuring or normal they will be mailed to you or sent through [employer] within 7 days. If the lab tests need quick action we will call you with the results. The phone number we will call with results is # [phone].      FOLLOW UP: Schedule an appointment with me in four weeks  or sooner as needed.  The intake team should be calling you to schedule.  If you dont hear from them, or they were unable to reach you, please call [phone] to schedule.  Follow up with primary care provider as planned or for acute medical concerns.  Call the psychiatric nurse line with medication questions or concerns at [phone].      Medication Refills:  If you need any refills please call your pharmacy and they will contact us. Our fax number for refills is [phone]. Please allow three business for refill processing. If you need to  your refill at a new pharmacy, please contact the new pharmacy directly. The new pharmacy will help you get your medications transferred.       Financial Assistance [phone]  [facility]  Billing [phone]  Central Billing Office, [facility]: [phone]  [facility] Billing [phone]  Medical Records [phone]  [facility] Patient Bill of Rights [email or web address]       MENTAL HEALTH CRISIS RESOURCES:  For a emergency help, please call 911 or go to the nearest Emergency Department.     Emergency Walk-In Options:   EmPATH Unit @ [facility] ([place]): [phone] - Specialized mental health emergency area designed to be calming  [facility] ([place]): [phone]  [facility] ([place]): [phone]  [facility] ([place]): [phone]    County Crisis Information:   [place]: [phone]  [place]: [phone]  [place] ([place]) - Adult: [phone]     Child: [phone]  [place] - Adult: [phone]     Child: [phone]  [place]: [phone]  List of all [facility] resources:   https://[place].gov/dhs/people-we-serve/adults/health-care/mental-health/resources/crisis-contacts.jsp    National Crisis Information:   Crisis Text Line: Text  [place]  to 145046  National Suicide Prevention Lifeline: 3-236-953-QGSC (1-582.176.3358)       For online chat options, visit https://suicidepreventionlifeline.org/chat/  Poison Control Center: 1-692.669.9464  Trans Lifeline: 9-697-267-4560 - Hotline for transgender people of all ages  The Jerry Project: 7-913-959-2582 - Hotline for LGBT youth     For Non-Emergency Support:   Fast Tracker: Mental Health & Substance Use Disorder Resources -   https://www.fasttrackermn.org/       Again thank you for choosing [facility] and please let us know how we can best partner with you to improve you and your family's health.    You may be receiving a survey regarding this appointment. We would love to have your feedback, both positive and negative. The survey is done by an external company, so your  answers are anonymous.

## 2022-10-17 NOTE — ASSESSMENT & PLAN NOTE
Many medication trials.  Currently on low dose of sertraline and possible increase in headaches.  She would like to continue current dose after we discussed utilizing [employer], a neuropsychiatric pharmacogenomics and pharmacokinetics test to use in clinical decision making. Directed to call customer service to evaluate the financial responsibility they may have at [phone] or [email or web address].   Order placed via their portal to send the kit to the patient should they decide to go forward.    [email or web address]    Since a seasonal component, Look at SAD lamp next visit    Follow-up 4 weeks

## 2022-11-16 ENCOUNTER — TELEPHONE (OUTPATIENT)
Dept: PSYCHIATRY | Facility: CLINIC | Age: 22
End: 2022-11-16

## 2022-11-16 NOTE — TELEPHONE ENCOUNTER
Prior Authorization Retail Medication Request    Medication/Dose: sertraline (ZOLOFT) 25 MG tablet  ICD code (if different than what is on RX):  Major depressive disorder with single episode, in partial remission (H) [F32.4] - TRISHA (generalized anxiety disorder) [F41.1]   Previously Tried and Failed:    Rationale:      Insurance Name:  [facility]  Insurance ID:  [id number]    Pharmacy Information (if different than what is on RX)  [facility]:  [id number] [place], [place] - [address]  Phone:  [phone]

## 2022-11-17 ENCOUNTER — TELEPHONE (OUTPATIENT)
Dept: PSYCHIATRY | Facility: CLINIC | Age: 22
End: 2022-11-17

## 2022-11-17 DIAGNOSIS — F41.1 GAD (GENERALIZED ANXIETY DISORDER): ICD-10-CM

## 2022-11-17 DIAGNOSIS — F32.4 MAJOR DEPRESSIVE DISORDER WITH SINGLE EPISODE, IN PARTIAL REMISSION (H): ICD-10-CM

## 2022-11-17 NOTE — TELEPHONE ENCOUNTER
Original script for sertraline was 25mg take 2 tabs daily. Pharmacy sent a PA request due to insurance not covering 2 tabs. Spoke with pharmacy and her insurance will cover the 50mg tab. It also looks like a PA was started. Didn't know if you wanted to send in new script.  Thanks  Taya Ridley RN on 11/17/2022 at 8:38 AM

## 2022-11-17 NOTE — TELEPHONE ENCOUNTER
Central Prior Authorization Team   Phone: [phone]      PA Initiation    Medication: sertraline (ZOLOFT) 25 MG tablet--initiated  Insurance Company: [employer] - Phone [phone] Fax [phone]  [facility] the [id number] - [place], [place] - [address] & [facility]  Filling Pharmacy Phone: [phone]  Filling Pharmacy Fax:    Start Date: 11/17/2022

## 2022-11-17 NOTE — TELEPHONE ENCOUNTER
Central Prior Authorization Team   Phone: [phone]      Prior Authorization Not Needed per Insurance    Medication: sertraline (ZOLOFT) 25 MG tablet--NOT NEEDED  Insurance Company: [employer] - Phone [phone] Fax [phone]  Expected CoPay:      Pharmacy Filling the Rx: [id number] - [place], [place] - [address]  Pharmacy Notified: Yes  Patient Notified: Yes PHARMACY WILL CONTACT WHEN FILLED     PROVIDER CHANGED TO ONE 50 MG TABLET. NO PA REQUIRED

## 2022-11-23 ENCOUNTER — VIRTUAL VISIT (OUTPATIENT)
Dept: BEHAVIORAL HEALTH | Facility: CLINIC | Age: 22
End: 2022-11-23
Payer: COMMERCIAL

## 2022-11-23 ENCOUNTER — VIRTUAL VISIT (OUTPATIENT)
Dept: PSYCHIATRY | Facility: CLINIC | Age: 22
End: 2022-11-23
Payer: COMMERCIAL

## 2022-11-23 DIAGNOSIS — F32.4 MAJOR DEPRESSIVE DISORDER WITH SINGLE EPISODE, IN PARTIAL REMISSION (H): Primary | ICD-10-CM

## 2022-11-23 DIAGNOSIS — F41.1 GAD (GENERALIZED ANXIETY DISORDER): ICD-10-CM

## 2022-11-23 DIAGNOSIS — F33.1 MODERATE EPISODE OF RECURRENT MAJOR DEPRESSIVE DISORDER (H): ICD-10-CM

## 2022-11-23 DIAGNOSIS — F41.1 GAD (GENERALIZED ANXIETY DISORDER): Primary | ICD-10-CM

## 2022-11-23 PROCEDURE — 99207 PR NO BILLABLE SERVICE THIS VISIT: CPT

## 2022-11-23 PROCEDURE — 99214 OFFICE O/P EST MOD 30 MIN: CPT | Mod: 95 | Performed by: NURSE PRACTITIONER

## 2022-11-23 RX ORDER — FOLIC ACID 1 MG/1
1 TABLET ORAL DAILY
Qty: 30 TABLET | Refills: 11 | Status: SHIPPED | OUTPATIENT
Start: 2022-11-23

## 2022-11-23 RX ORDER — DESVENLAFAXINE 25 MG/1
25 TABLET, EXTENDED RELEASE ORAL DAILY
Qty: 7 TABLET | Refills: 0 | Status: SHIPPED | OUTPATIENT
Start: 2022-11-23 | End: 2023-01-05 | Stop reason: DRUGHIGH

## 2022-11-23 RX ORDER — DESVENLAFAXINE 50 MG/1
50 TABLET, FILM COATED, EXTENDED RELEASE ORAL DAILY
Qty: 30 TABLET | Refills: 0 | Status: SHIPPED | OUTPATIENT
Start: 2022-11-23 | End: 2023-01-06

## 2022-11-23 ASSESSMENT — PATIENT HEALTH QUESTIONNAIRE - PHQ9
SUM OF ALL RESPONSES TO PHQ QUESTIONS 1-9: 7
SUM OF ALL RESPONSES TO PHQ QUESTIONS 1-9: 7
10. IF YOU CHECKED OFF ANY PROBLEMS, HOW DIFFICULT HAVE THESE PROBLEMS MADE IT FOR YOU TO DO YOUR WORK, TAKE CARE OF THINGS AT HOME, OR GET ALONG WITH OTHER PEOPLE: SOMEWHAT DIFFICULT

## 2022-11-23 NOTE — PROGRESS NOTES
PSYCHIATRIC MEDICATION FOLLOW UP APPT     Name:  Maria Antonia Marr  : 2000    Maria Antonia Marr is a 22 year old female who is being evaluated via a billable video visit.      How would you like to obtain your AVS? MyChart  If the video visit is dropped, the invitation should be resent by: Text to cell phone: [phone]  Will anyone else be joining your video visit? No       Location [place] patient[place] If not at home address below, please ask where they are in case of an emergency situation arises during the appointment.  [address] [place] [address]       Telemedicine Visit: The patient's condition can be safely assessed and treated via synchronous audio and visual telemedicine encounter.       Reason for Telemedicine Visit: COVID 19 pandemic and the social and physical recommendations by the CDC and [facility].       Originating Site (Patient Location): Patient's home     Distant Site (Provider Location): Provider Remote Setting     Consent:  The patient/guardian has verbally consented to: the potential risks and benefits of telemedicine (video visit or phone) versus in person care; bill my insurance or make self-payment for services provided; and responsibility for payment of non-covered services.      Mode of Communication:  DirectPointe video platform      As the provider I attest to compliance with applicable laws and regulations related to telemedicine.     IDENTIFICATION   Referred by: Francisca Mendez MD [facility]   Patient Care Team:  Francisca Mendez MD as PCP - General (Internal Medicine)  Francisca Mendez MD as Assigned PCP  Jitendra Greene MD as Fellow  Therapist: currently [facility]      Patient attended the phone/video session alone.    Last seen for outpatient psychiatry Consultation on 10/12/2022.      FOLLOWING PLAN PUT INTO PLACE: Many medication trials.  Currently on low dose of sertraline and possible increase in headaches.  She would like to continue current dose after we  discussed utilizing [employer], a neuropsychiatric pharmacogenomics and pharmacokinetics test to use in clinical decision making. Directed to call customer service to evaluate the financial responsibility they may have at [phone] or [email or web address].   Order placed via their portal to send the kit to the patient should they decide to go forward.     [email or web address]     Since a seasonal component, Look at SAD lamp next visit     Follow-up 4 weeks       INTERIM HISTORY     COMMUNICATIONS FROM PATIENT VIA:  none    RECORDS AVAILABLE FOR REVIEW: EHR records through [facility]  and previous psychiatric progress note.  In addition, reviewed the assessment completed by Collette Ugarte [facility], dated today        HISTORY OF PRESENT ILLNESS   CCPS referral for psychiatric medication consult in October 2022.  Reports history of depression, anxiety and unspecified eating disorder (restricting and purging).  Denies prior psychiatric hospitalizations. No history of suicidal thoughts or attempts. No history of self-injurious behaviors. Genetically loaded for  depression, anxiety and remote history of bipolar. Grew up in an intact home with all basic needs being met.       Reports past diagnosis of the following in July 2021 psych evaluation with [facility] with a diagnosis of Generalized Anxiety Disorder, Major Depressive Disorder, unspecified eating disorder.  First sought treatment 2018 and does note depression is worse in the winter, seasonal component.     Pt endorses that she has experienced depression and anxiety symptoms since the start of college. Pt endorses that her school and work load are affecting her mental health and exacerbating symptoms.     FAMILY, MEDICAL, SURGICAL HISTORY REVIEWED.  MEDICATION HAVE BEEN REVIEWED AND ARE CURRENT TO THE BEST OF MY KNOWLEDGE AND ABILITY.  Student at [employer], , Studying counseling psychology at [employer] and  "reports being a full-time student. Pt also endorses have a full-time job at [employer], as a       [employer] [place],       MEDICATIONS                                                                                                Current Outpatient Medications   Medication Sig     sertraline (ZOLOFT) 50 MG tablet Take 1 tablet (50 mg) by mouth daily     No current facility-administered medications for this visit.      NOTES ABOUT CURRENT PSYCHOTROPIC MEDICATIONS:   Sertraline 50 mg, migraines more often,       PAST PSYCHOTROPIC MEDICATIONS:  Duloxetine, restless sleep, vivid dreams  Escitalopram, not effective  Trazodone   Hydroxyzine, \"zombie\"      TODAY PATIENT REPORTS THE FOLLOWING PSYCHIATRIC ROS:   Per [facility], Collette Ugarte, during today's team-based visit:  \"MH update: Pt states that her medications have been increased to 50 MG, but pt states that she feels worse. Unclear if this medication related or sensitivity to seasonal affective depression Pt endorses low motivation, sleeping more and endorses helplessness. Pt states that she feels that her symptoms have worsened after day light savings. Pt denies safety concerns including SI/HI/SIB. Pt states that she feels really on edge, and endorses racing thoughts in her head constantly. Pt states that she is working with her individual therapist on deep breathing and this is helpful occasionally.  Stresses: Pt states that she is still working full-time and school full-time. Pt states that she is able to get to work in the morning with some difficulty. Anxiety is related to the holidays and being around family with differing political view and views on sexuality/sexual orientation.  Appetite: Pt reports \"on and off\" in regards to wanting to eat food.  Sleep: Pt states that it is more difficult to fall asleep and difficult to wake up in the morning. Pt endorses vivid dreams- possible side effect. from Zoloft.  Therapy:  Mahnaz @ " "[facility], seeing on a weekly basis.  NURIS: Pt denies  Preg: Pt denies  Goals: Pt would like her mood to be stable overall.  Most important: Pt would like to discuss medication concerns and effectiveness. Review the gene site testing results. Pt would also like to discuss getting and \"sad lamp\" and the effectiveness of the lamp.\"     EXERCISE: Adequate  SIDE EFFECTS:   tolerating medications without reported side effects  COMPLIANCE:   states Adherent to medication regimen  REPORTS THE FOLLOWING NEW MEDICAL ISSUES:   none    PROBLEM: DEPRESSION: Worsening    Last PHQ-9 11/23/2022   1.  Little interest or pleasure in doing things 1   2.  Feeling down, depressed, or hopeless 1   3.  Trouble falling or staying asleep, or sleeping too much 1   4.  Feeling tired or having little energy 1   5.  Poor appetite or overeating 1   6.  Feeling bad about yourself 1   7.  Trouble concentrating 1   8.  Moving slowly or restless 0   Q9: Thoughts of better off dead/self-harm past 2 weeks 0   PHQ-9 Total Score 7     PHQ-9 SCORE 9/12/2022 11/23/2022   PHQ-9 Total Score MyChart 5 (Mild depression) 7 (Mild depression)   PHQ-9 Total Score 5 7     PHQ9 score is 7 indicating mild depression.   Suicidal ideation:  No     PROBLEM: ANXIETY: No change.    GAD7 score is Not completed today  TRISHA-7 SCORE 9/12/2022   Total Score 2 (minimal anxiety)   Total Score 2          PERTINENT PAST MEDICAL AND SURGICAL HISTORY   No past medical history on file.    VITALS     BP Readings from Last 1 Encounters:   08/27/21 122/70     Pulse Readings from Last 1 Encounters:   08/27/21 70     Wt Readings from Last 1 Encounters:   08/27/21 87.1 kg (192 lb)     Ht Readings from Last 1 Encounters:   08/27/21 1.803 m (5' 11\")     Estimated body mass index is 26.78 kg/m  as calculated from the following:    Height as of 8/27/21: 1.803 m (5' 11\").    Weight as of 8/27/21: 87.1 kg (192 lb).    LABS & IMAGING                                                                     "                                               Recent Labs   Lab Test 08/11/22  0905   WBC 5.1   HGB 14.3   HCT 41.7   MCV 85        Recent Labs   Lab Test 08/11/22  0905      POTASSIUM 4.0   CHLORIDE 106   CO2 25   GLC 91   NURY 9.0   BUN 17   CR 0.87   GFRESTIMATED >90   ALBUMIN 4.5   PROTTOTAL 7.2   AST 15   ALT 16   ALKPHOS 45   BILITOTAL 1.2        ALLERGY & IMMUNIZATIONS     No Known Allergies    MEDICAL REVIEW OF SYSTEMS:   Ten system review was completed with pertinent positives noted     MENTAL STATUS EXAM:      General/Constitutional:  Appearance:   awake, alert, adequately groomed, appeared stated age and no apparent distress  Attitude:    cooperative   Eye Contact:  good  Musculoskeletal:  Psychomotor Behavior:  no evidence of tardive dyskinesia, dystonia, or tics from the head up  Psychiatric:  Speech:  clear, coherent, regular rate, rhythm, and volume,   No pressure speech noted.  Associations:  no loose associations  Thought Process:   logical, linear and goal oriented  Thought Content:    No evidence of suicidal ideation or homicidal ideation, no evidence of psychotic thought, no auditory hallucinations present and no visual hallucinations present  Mood:  angry and good  Affect:  full range/stable (normal variation of emotions during exam) and was congruent to speech content.  Insight:  good  Judgment:  intact, adequate for safety  Impulse Control:  intact  Neurological:  Oriented to:  person, place, time, and situation  Attention Span and Concentration:  Able to attend to the interview      Language: intact     Recent and Remote Memory:  Intact to interview. Not formally assessed. No amnesia.    Fund of Knowledge: appropriate        SAFETY   Feels safe in home: YES     Suicidal ideation: Denies  History of suicide attempts:  No   Hx of impulsivity: No   Hope for the future: present    Hx of Command hallucinations or current psychosis: None endorsed    History of Self-injurious behaviors:     Family member  by suicide:  not discussed       SAFETY ASSESSMENT:   Based on all available evidence including the factors cited above, overall Risk for harm is low and is appropriate for outpatient level of care.   Recommended that patient call 911 or go to the local ED should there be a change in any of these risk factors.       DSM 5 DIAGNOSIS:   296.32 (F33.1) Major Depressive Disorder, Recurrent Episode, Moderate _  300.02 (F41.1) Generalized Anxiety Disorder      MEDICAL COMORBIDITY IMPACTING CLINICAL PICTURE: None noted.  Known issue that I take into account for their medical decisions, no current exacerbations or new concerns      ASSESSMENT AND PLAN      Problem List Items Addressed This Visit        Behavioral     Discussed results of the [employer] pharmacogenomic testing.  Sad lamp script in letter of MyChart and also letter of medication recommendation.  She will send to her insurance.  In addition, the sertraline is not effective and side effect of headache.  Will transition to desvenlafaxine 25 mg for 7 days and then 50 mg.  Follow-up in six weeks          TRISHA (generalized anxiety disorder)   Other Visit Diagnoses     Major depressive disorder with single episode, in partial remission (H)    -  Primary           CONSULTS/REFERRALS:   Continue therapy   Coordinate care with therapist as needed     MEDICAL:   None at this time  Coordinate care with PCP (Francisca Mendez) as needed  Follow up with primary care provider as planned or for acute medical concerns.     PSYCHOEDUCATION:  Medication side effects and alternatives reviewed. Health promotion activities recommended and reviewed today. All questions addressed. Education and counseling completed regarding risks and benefits of medications and psychotherapy options.  Consent provided by patient/guardian  Call the psychiatric nurse line with medication questions or concerns at [phone].  V-me Mediahart may be used to communicate with your provider, but  this is not intended to be used for emergencies.  Medlineplus.gov is information for patients.  It is run by the National Library of Medicine and it contains information about all disorders, diseases and all medications.       COMMUNITY RESOURCES:    CRISIS NUMBERS: Provided in AVS 10/12/2022  National Suicide Prevention Lifeline: 4-487-595-TALK (113-346-2160)  Gradematic.com/resources for a list of additional resources (SOS)            [facility] - [phone]   Urgent Care Adult Mental [facility]-[phone] mobile unit/  crisis line  [facility] -[phone]   COPE  [place] Mobile Team -[phone] (adults)/ [phone] (child)  Poison Control Center - 1-939.464.2637    OR  go to nearest ER  Crisis Text Line for any crisis  send this-   To: 761984   [facility]  [phone]  National Suicide Prevention Lifeline: 331.153.7972 (TTY: 643.149.4003). Call anytime for help.  (www.suicidepreventionlifeline.org)  National [place] on Mental Illness (www.[place].org): 673.790.7880 or 301-664-5198.   Mental Health Association (www.mentalhealth.org): 123.653.4966 or 667-828-7454.  [place] Crisis Text Line: Text [place] to 878667  Suicide LifeLine Chat: suicidepreSpoolline.org/chat         ADMINISTRATIVE BILLIN minutes spent interviewing patient, reviewing referral documents, obtaining and reviewing outside records, communication with other health specialists, and preparing this report on today's date    Video/Phone Start Time: 1122  Video/Phone End Time: 1145    Patient Status:  Patient will continue to be seen for ongoing consultation and stabilization.    Signed:   Jami Lee, MSN, APRN, [facility]ry [facility] ([facility]PS)   Chart documentation done in part with Dragon Voice Recognition software.  Although reviewed after completion, some word and grammatical errors may remain.

## 2022-11-23 NOTE — PROGRESS NOTES
[facility] Primary Care: Integrated Behavioral Health  November 23, 2022      Behavioral Health Clinician Progress Note    Patient Name: Maria Antonia Mrar           Service Type:  Individual      Service Location:   MyChart / Email (patient reached)     Session Start Time: 11:05 AM   Session End Time: 11:19 AM      Session Length: <15 min     Attendees: Patient     Service Modality:  Video Visit:      Provider verified identity through the following two step process.  Patient provided:  Patient is known previously to provider    Telemedicine Visit: The patient's condition can be safely assessed and treated via synchronous audio and visual telemedicine encounter.      Reason for Telemedicine Visit: Services only offered telehealth    Originating Site (Patient Location): Patient's home    Distant Site (Provider Location): [facility]    Consent:  The patient/guardian has verbally consented to: the potential risks and benefits of telemedicine (video visit) versus in person care; bill my insurance or make self-payment for services provided; and responsibility for payment of non-covered services.     Patient would like the video invitation sent by:  My Chart    Mode of Communication:  Video Conference via [employer]    Distant Location (Provider):  On-site    As the provider I attest to compliance with applicable laws and regulations related to telemedicine.    Visit Activities (Refresh list every visit): [facility] Only    Diagnostic Assessment Date: 10/12/22  Treatment Plan Review Date: TBD  See Flowsheets for today's PHQ-9 and TRISHA-7 results  Previous PHQ-9:   PHQ-9 SCORE 9/12/2022 11/23/2022   PHQ-9 Total Score MyChart 5 (Mild depression) 7 (Mild depression)   PHQ-9 Total Score 5 7     Previous TRISHA-7:   TRISHA-7 SCORE 9/12/2022   Total Score 2 (minimal anxiety)   Total Score 2       PAT LEVEL:  No flowsheet data found.    DATA2  Extended Session (60+ minutes): No  Interactive  "Complexity: No  Crisis: No  [facility] Patient: No    Treatment Objective(s) Addressed in This Session:  Target Behavior(s): Reducing depression symptoms and increasing mood. Reducing ruminating thoughts and anxious distress.     Depressed Mood: Increase interest, engagement, and pleasure in doing things  Decrease frequency and intensity of feeling down, depressed, hopeless  Improve quantity and quality of night time sleep / decrease daytime naps  Feel less tired and more energy during the day   Improve diet, appetite, mindful eating, and / or meal planning  Anxiety: will experience a reduction in anxiety, will develop more effective coping skills to manage anxiety symptoms, will develop healthy cognitive patterns and beliefs and will increase ability to function adaptively    Current Stressors / Issues:    MH update: Pt states that her medication has been increased to 50 MG, but pt states that she feels worse. Unclear if this medication related or sensitivity to seasonal affective depression. Pt endorses low motivation, sleeping more and endorses helplessness. Pt states that she feels that her symptoms have worsened after day light savings. Pt denies safety concerns including SI/HI/SIB. Pt states that she feels really on edge, and endorses racing thoughts in her head constantly. Pt states that she is working with her individual therapist on deep breathing and this is helpful occasionally.     Stresses: Pt states that she is still working full-time and school full-time. Pt states that she is able to get to work in the morning with some difficulty. Anxiety related to the holidays and being around family with differing political views and views on sexuality/sexual orientation.     Appetite: Pt reports \"on and off\" in regards to wanting to eat food.     Sleep: Pt states that it is more difficult to fall asleep and difficult to wake up in the morning. Pt endorses vivid dreams- possible side effect. from Zoloft.     Therapy:  " "Mahnaz @ [facility], seeing on a weekly basis.     NURIS: Pt denies     Preg: Pt denies     Goals: Pt would like her mood to be stable overall.     Most important: Pt would like to discuss medication concerns and effectiveness. Review the gene site testing results. Pt would also like to discuss getting and \"sad lamp\" and the effectiveness of the lamp.       Progress on Treatment Objective(s) / Homework:  New Objective established this session - PREPARATION (Decided to change - considering how); Intervened by negotiating a change plan and determining options / strategies for behavior change, identifying triggers, exploring social supports, and working towards setting a date to begin behavior change    Motivational Interviewing    MI Intervention: Expressed Empathy/Understanding and Open-ended questions     Change Talk Expressed by the Patient: Ability to change Reasons to change    Provider Response to Change Talk: E - Evoked more info from patient about behavior change, A - Affirmed patient's thoughts, decisions, or attempts at behavior change, R - Reflected patient's change talk and S - Summarized patient's change talk statements      Care Plan review completed: Yes    Medication Review:  No changes to current psychiatric medication(s)    Medication Compliance:  Yes    Changes in Health Issues:   Yes: patient states that she may have a torn cartilidge in her knee and needs an MRI.     Chemical Use Review:   Substance Use: Chemical use reviewed, no active concerns identified      Tobacco Use: No current tobacco use.      Assessment: Current Emotional / Mental Status (status of significant symptoms):  Risk status (Self / Other harm or suicidal ideation)  Patient denies a history of suicidal ideation, suicide attempts, self-injurious behavior, homicidal ideation, homicidal behavior and and other safety concerns  Patient denies current fears or concerns for personal safety.  Patient denies current or recent " suicidal ideation or behaviors.  Patient denies current or recent homicidal ideation or behaviors.  Patient denies current or recent self injurious behavior or ideation.  Patient denies other safety concerns.  A safety and risk management plan has not been developed at this time, however patient was encouraged to call [place] [phone] should there be a change in any of these risk factors.    Appearance:   Appropriate   Eye Contact:   Good   Psychomotor Behavior: Normal   Attitude:   Cooperative   Orientation:   All  Speech   Rate / Production: Normal    Volume:  Normal   Mood:    Depressed   Affect:    Blunted   Thought Content:  Clear   Thought Form:  Coherent  Logical   Insight:    Good     Diagnoses:  1. TRISHA (generalized anxiety disorder)    2. Moderate episode of recurrent major depressive disorder (H)        Collateral Reports Completed:  [facility] Jami Lee CNP    Plan: (Homework, other):  Patient was given information about behavioral services and encouraged to schedule a follow up appointment with the clinic [facility] in 1 month.  She was also given information about mental health symptoms and treatment options .  CD Recommendations: No indications of CD issues.     CRISTA Pete, [facility]  Behavioral Health Clinician  [facility], [address], [address], [place], [address]  [phone]    2022

## 2022-12-01 ENCOUNTER — TELEPHONE (OUTPATIENT)
Dept: PSYCHIATRY | Facility: CLINIC | Age: 22
End: 2022-12-01

## 2022-12-01 NOTE — TELEPHONE ENCOUNTER
Maria Antonia HERNANDEZ Psych Rn - [facility] (supporting Jami Lee, GEORGE CNP) 8 days ago     MG  Hello! I need prior authorization for the medication prescribed today. Thanks in advance!    PA team, would you look into possible PA request for patient's Pristiq. Thanks.

## 2022-12-01 NOTE — TELEPHONE ENCOUNTER
PRIOR AUTHORIZATION DENIED    Medication: Desvenlafaxine 25-DENIED    Denial Date: 12/1/2022    Denial Rational:                 Appeal Information:   If provider would like to appeal please provide a letter of medical necessity stating why formulary alternatives would not be clinically appropriate for patient and route back to the PA team.

## 2022-12-01 NOTE — TELEPHONE ENCOUNTER
Central Prior Authorization Team   Phone: [phone]      PA Initiation    Medication: Desvenlafaxine 25  Insurance Company: [employer] - Phone [phone] Fax [phone]  Pharm[facility] the Rx[id number] - [place], [place] - [address]  Filling Pharmacy Phone: [phone]  Filling Pharmacy Fax:    Start Date: 12/1/2022

## 2022-12-02 NOTE — TELEPHONE ENCOUNTER
Call to patient to discuss prior off denial.  Offered to transition to venlafaxine versus using good Rx for the Pristiq.  She would like to pursue Pristiq.  Also discussed the SAD lamp and how to access that through her insurance

## 2022-12-02 NOTE — ASSESSMENT & PLAN NOTE
Discussed results of the [employer] pharmacogenomic testing.  Sad lamp script in letter of MyChart and also letter of medication recommendation.  She will send to her insurance.  In addition, the sertraline is not effective and side effect of headache.  Will transition to desvenlafaxine 25 mg for 7 days and then 50 mg.  Follow-up in six weeks

## 2022-12-22 NOTE — PATIENT INSTRUCTIONS
**For crisis resources, please see the information at the end of this document**     Thank you for coming to the [facility] [place] [facility].    TREATMENT PLAN:    MEDICATIONS:   - start desvenlafaxine titrated to 50 mg  -SAD lamp letter written.  See letters section     LABS/PROCEDURES:  none at present   Please call your [place] clinic and ask for a lab only appointment at your earliest convenience.  If your results are reassuring or normal they will be mailed to you or sent through Fuhu within 7 days. If the lab tests need quick action we will call you with the results. The phone number we will call with results is # [phone].      FOLLOW UP: Schedule an appointment with me in four weeks  or sooner as needed.  The intake team should be calling you to schedule.  If you dont hear from them, or they were unable to reach you, please call [phone] to schedule.  Follow up with primary care provider as planned or for acute medical concerns.  Call the psychiatric nurse line with medication questions or concerns at [phone].      Medication Refills:  If you need any refills please call your pharmacy and they will contact us. Our fax number for refills is [phone]. Please allow three business for refill processing. If you need to  your refill at a new pharmacy, please contact the new pharmacy directly. The new pharmacy will help you get your medications transferred.     [facility] Assistance [phone]  Financial Assistance [phone]  [employer] Billing [phone]  [facility] Billing [facility], [facility]: [phone]  [place] Billing [phone]  Medical Records [phone]  [facility] Patient Bill of Rights [email or web address]       MENTAL HEALTH CRISIS RESOURCES:  For a emergency help, please call 911 or go to the nearest Emergency Department.     Emergency Walk-In Options:   EmPATH Unit @ [facility]  [facility] ([place]): [phone] - Specialized mental health emergency area designed to be calming  [facility] ([place]): [phone]  [facility] ([place]): [phone]  [facility] ([place]): [phone]    National Crisis Information: Call 988 Suicide and Crisis Lifeline  Crisis Text Line (free 24/7):  call **CRISIS (**408646) Crisis or use the texting option by texting 239690.   National Suicide Prevention Lifeline: Call 988  Poison Control Center: 4-092-634-6726  Trans Lifeline: 7-216-987-7447 - Hotline for transgender people of all ages  The Jerry Project: 4-005-450-9438 - Hotline for LGBT youth   List of all [facility] resources:   [place]://[place].gov[place]dhs/people-we-serve/adults/health-care/mental-health/resources/crisis-contacts.jsp    For Non-Emergency Support:   Fast Tracker: Mental Health & Substance Use Disorder Resources -   https://www.fasttrackermn.org/       Again thank you for choosing [facility] [place] [facility] and please let us know how we can best partner with you to improve you and your family's health.    You may be receiving a survey regarding this appointment. We would love to have your feedback, both positive and negative. The survey is done by an external company, so your answers are anonymous.

## 2023-01-04 NOTE — PROGRESS NOTES
[facility] Behavioral Health [facility]   2023      Behavioral Health Clinician Progress Note    Patient Name: Maria Antonia Marr           Service Type:  Individual      Service Location:   MyChart / Email (patient reached)     Session Start Time: 1030am  Session End Time: 1038am      Session Length: 8 min     Attendees: Patient     Service Modality:  Video Visit:      Provider verified identity through the following two step process.  Patient provided:  Patient  and Patient address    Telemedicine Visit: The patient's condition can be safely assessed and treated via synchronous audio and visual telemedicine encounter.      Reason for Telemedicine Visit: Services only offered telehealth    Originating Site (Patient Location): Patient's home    Distant Site (Provider Location): Provider Remote Setting- Home Office    Consent:  The patient/guardian has verbally consented to: the potential risks and benefits of telemedicine (video visit) versus in person care; bill my insurance or make self-payment for services provided; and responsibility for payment of non-covered services.     Patient would like the video invitation sent by:  My Chart    Mode of Communication:  Video Conference via Amwell    Distant Location (Provider):  Off-site    As the provider I attest to compliance with applicable laws and regulations related to telemedicine.    Visit Activities (Refresh list every visit): [facility] Only    Diagnostic Assessment Date: 10/12/2022  Treatment Plan Review Date: 2023  See Flowsheets for today's PHQ-9 and TRISHA-7 results  Previous PHQ-9:   PHQ-9 SCORE 2022   PHQ-9 Total Score [facility] 5 (Mild depression) 7 (Mild depression) 2 (Minimal depression)   PHQ-9 Total Score 5 7 2     Previous TRISHA-7:   TRISHA-7 SCORE 2022   Total Score 2 (minimal anxiety)   Total Score 2       PAT LEVEL:  No flowsheet data found.    DATA  Extended Session  (60+ minutes): No  Interactive Complexity: No  Crisis: No  [facility] Patient: No    Treatment Objective(s) Addressed in This Session:  Target Behavior(s): improve motivation    Depressed Mood: Increase interest, engagement, and pleasure in doing things    Current Stressors / Issues:  MH update: Pt reports that her symptoms have improved despite holidays being stressful. Feels motivated and productive. Pt reports that she's using a Lemus lamp for Nik which has helped her sleep. Pt reports that she's trying to have a healthier lifestyle including increased exercise and healthy eating. Pt reports she's been using skills she's learning in therapy. Symptoms stable: motivation, focus improved.   Stresses: No  Appetite: unremarkable  Sleep: unremarkable  Therapy: [facility] at [facility] weekly  NURIS: No  Preg: No  Most important: med review      Progress on Treatment Objective(s) / Homework:  Stable - MAINTENANCE (Working to maintain change, with risk of relapse); Intervened by continuing to positively reinforce healthy behavior choice     Motivational Interviewing    MI Intervention: Co-Developed Goal: enhance motivation, Expressed Empathy/Understanding, Open-ended questions, Reflections: simple and complex, Change talk (evoked) and Reframe     Change Talk Expressed by the Patient: Desire to change Ability to change Reasons to change Need to change Committment to change Activation Taking steps    Provider Response to Change Talk: E - Evoked more info from patient about behavior change, A - Affirmed patient's thoughts, decisions, or attempts at behavior change, R - Reflected patient's change talk and S - Summarized patient's change talk statements      Care Plan review completed: Yes    Medication Review:  No changes to current psychiatric medication(s)    Medication Compliance:  Yes    Changes in Health Issues:   None reported    Chemical Use Review:   Substance Use: Chemical use reviewed, no active concerns identified       Tobacco Use: No current tobacco use.      Assessment: Current Emotional / Mental Status (status of significant symptoms):  Risk status (Self / Other harm or suicidal ideation)  Patient denies a history of suicidal ideation, suicide attempts, self-injurious behavior, homicidal ideation, homicidal behavior and and other safety concerns  Patient denies current fears or concerns for personal safety.  Patient denies current or recent suicidal ideation or behaviors.  Patient denies current or recent homicidal ideation or behaviors.  Patient denies current or recent self injurious behavior or ideation.  Patient denies other safety concerns.  A safety and risk management plan has not been developed at this time, however patient was encouraged to call [place] [phone] should there be a change in any of these risk factors.    Appearance:   Appropriate   Eye Contact:   Good   Psychomotor Behavior: Normal   Attitude:   Cooperative   Orientation:   All  Speech   Rate / Production: Normal    Volume:  Normal   Mood:    Normal  Affect:    Appropriate   Thought Content:  Clear   Thought Form:  Coherent  Logical   Insight:    Good     Diagnoses:  1. Depression, major, recurrent, mild (H)        Collateral Reports Completed:  Collaborated with [facility] team    Plan: (Homework, other):  Patient was given information about behavioral services and encouraged to schedule a follow up appointment with the clinic [facility] in 1 month.  She was also given information about mental health symptoms and treatment options .  CD Recommendations: No indications of CD issues. Collette Ugarte [facility]         ______________________________________________________________________    Integrated Primary Care Behavioral Health Treatment Plan    Patient's Name: Maria Antonia Marr  YOB: 2000    Date of Creation: 1/5/2023  Date Treatment Plan Last Reviewed/Revised: pending    DSM5 Diagnoses: 296.31 (F33.0) Major Depressive Disorder, Recurrent  Episode, Mild _ and With anxious distress  Psychosocial / Contextual Factors: none identified  PROMIS (reviewed every 90 days): 11/23/2022 (28)    Referral / Collaboration:  Collaborated with [facility] team.    Anticipated number of session for this episode of care: 10-12  Anticipation frequency of session: Monthly  Anticipated Duration of each session: 16-37 minutes  Treatment plan will be reviewed in 90 days or when goals have been changed.       MeasurableTreatment Goal(s) related to diagnosis / functional impairment(s)  Goal 1: Patient will experience improved depression    I will know I've met my goal when I'm consistently motivated and focused.      Objective #A (Patient Action)    Patient will Decrease frequency and intensity of feeling down, depressed, hopeless.  Status: New - Date: 1/5/2023     Intervention(s)  Therapist will teach emotional regulation skills. to manage depression.    Patient has reviewed and agreed to the above plan.      RAY Fisher  January 5, 2023

## 2023-01-05 ENCOUNTER — VIRTUAL VISIT (OUTPATIENT)
Dept: BEHAVIORAL HEALTH | Facility: CLINIC | Age: 23
End: 2023-01-05
Payer: COMMERCIAL

## 2023-01-05 ENCOUNTER — VIRTUAL VISIT (OUTPATIENT)
Dept: PSYCHIATRY | Facility: CLINIC | Age: 23
End: 2023-01-05
Payer: COMMERCIAL

## 2023-01-05 DIAGNOSIS — F33.0 DEPRESSION, MAJOR, RECURRENT, MILD (H): Primary | ICD-10-CM

## 2023-01-05 DIAGNOSIS — F33.1 MODERATE EPISODE OF RECURRENT MAJOR DEPRESSIVE DISORDER (H): ICD-10-CM

## 2023-01-05 DIAGNOSIS — F41.1 GAD (GENERALIZED ANXIETY DISORDER): ICD-10-CM

## 2023-01-05 PROCEDURE — 99214 OFFICE O/P EST MOD 30 MIN: CPT | Mod: 95 | Performed by: NURSE PRACTITIONER

## 2023-01-05 PROCEDURE — 99207 PR NO BILLABLE SERVICE THIS VISIT: CPT | Performed by: SOCIAL WORKER

## 2023-01-05 ASSESSMENT — PATIENT HEALTH QUESTIONNAIRE - PHQ9
SUM OF ALL RESPONSES TO PHQ QUESTIONS 1-9: 2
SUM OF ALL RESPONSES TO PHQ QUESTIONS 1-9: 2
10. IF YOU CHECKED OFF ANY PROBLEMS, HOW DIFFICULT HAVE THESE PROBLEMS MADE IT FOR YOU TO DO YOUR WORK, TAKE CARE OF THINGS AT HOME, OR GET ALONG WITH OTHER PEOPLE: SOMEWHAT DIFFICULT

## 2023-01-05 NOTE — PATIENT INSTRUCTIONS
**For crisis resources, please see the information at the end of this document**     Thank you for coming to the [facility] MENTAL HEALTH & ADDICTION [facility].    TREATMENT PLAN:    MEDICATIONS:   - continue desvenlafaxine 50 mg       CONSULTS/REFERRALS:   Continue therapy     LABS/PROCEDURES: none at present   Please call your [place] clinic and ask for a lab only appointment at your earliest convenience.  If your results are reassuring or normal they will be mailed to you or sent through [employer] within 7 days. If the lab tests need quick action we will call you with the results. The phone number we will call with results is # [phone].      FOLLOW UP: Schedule an appointment with me in two months or sooner as needed.  The intake team should be calling you to schedule.  If you dont hear from them, or they were unable to reach you, please call [phone] to schedule.  Follow up with primary care provider as planned or for acute medical concerns.  Call the psychiatric nurse line with medication questions or concerns at [phone].      Medication Refills:  If you need any refills please call your pharmacy and they will contact us. Our fax number for refills is [phone]. Please allow three business for refill processing. If you need to  your refill at a new pharmacy, please contact the new pharmacy directly. The new pharmacy will help you get your medications transferred.     [facility] Assistance [phone]  Financial Assistance [phone]  [employer] Billing [phone]  Central Billing Office, e[facility]: [phone]  [facility] Billing [phone]  Medical Records [phone]  [facility] Patient Bill of Rights [email or web address]       MENTAL HEALTH CRISIS RESOURCES:  For a emergency help, please call 911 or go to the nearest Emergency Department.     Emergency Walk-In Options:   EmPATH Unit @ [place] [facility] ([place]):  [phone] - Specialized mental health emergency area designed to be calming  [facility] ([place]): [phone]  [facility] Acute Psychiatry Services ([place]): [phone]  [facility] ([place]): [phone]    National Crisis Information: Call 988 Suicide and Crisis Lifeline  Crisis Text Line (free 24/7):  call **CRISIS (**152671) Crisis or use the texting option by texting 823141.   National Suicide Prevention Lifeline: Call 988  Poison Control Center: 0-877-211-3500  Trans Lifeline: 4-411-757-4758 - Hotline for transgender people of all ages  The Jerry Project: 3-410-508-6167 - Hotline for LGBT youth   List of all [facility] resources:   https://[place].gov/dhs/people-we-serve/adults/health-care/mental-health/resources/crisis-contacts.jsp    For Non-Emergency Support:   Fast Tracker: Mental Health & Substance Use Disorder Resources -   https://www.fasttrackermn.org/       Again thank you for choosing [facility] M[facility] HEALTH [facility] [place] [facility] and please let us know how we can best partner with you to improve you and your family's health.    You may be receiving a survey regarding this appointment. We would love to have your feedback, both positive and negative. The survey is done by an external company, so your answers are anonymous.

## 2023-01-05 NOTE — PROGRESS NOTES
PSYCHIATRIC MEDICATION FOLLOW UP APPT     Name:  Maria Antonia Marr  : 2000    Maria Antonia Marr is a 22 year old female who is being evaluated via a billable video visit.      How would you like to obtain your AVS? MyChart  If the video visit is dropped, the invitation should be resent by: Text to cell phone: [phone]  Will anyone else be joining your video visit? No       [place] If not at [place] address below, please ask where they are in case of an emergency situation arises during the appointment.  [address] [place] [address]       Telemedicine Visit: The patient's condition can be safely assessed and treated via synchronous audio and visual telemedicine encounter.       Reason for Telemedicine Visit: COVID 19 pandemic and the social and physical recommendations by the [facility] and [facility].       Originating Site (Patient Location): Patient's home     Distant Site (Provider Location): Provider Remote Setting     Consent:  The patient/guardian has verbally consented to: the potential risks and benefits of telemedicine (video visit or phone) versus in person care; bill my insurance or make self-payment for services provided; and responsibility for payment of non-covered services.      Mode of Communication:  iubenda video platform      As the provider I attest to compliance with applicable laws and regulations related to telemedicine.     IDENTIFICATION   Referred by: Francisca Mendez MD [facility]   Patient Care Team:  Francisca Mendez MD as PCP - General (Internal Medicine)  Francisca Mendez MD as Assigned PCP  Jitendra Greene MD as Fellow  Therapist: currently [facility]      Patient attended the phone/video session alone.    Last seen for outpatient psychiatry Consultation on 10/12/2022.      FOLLOWING PLAN PUT INTO PLACE: Discussed results of the [employer] pharmacogenomic testing.  Sad lamp script in letter of MyChart and also letter of medication recommendation.  She  will send to her insurance.  In addition, the sertraline is not effective and side effect of headache.  Will transition to desvenlafaxine 25 mg for 7 days and then 50 mg.  Follow-up in six weeks.  SAD lamp order to [facility]           INTERIM HISTORY     COMMUNICATIONS FROM PATIENT VIA: none    RECORDS AVAILABLE FOR REVIEW: EHR records through [facility]  and previous psychiatric progress note.  In addition, reviewed the assessment completed by Collette Ugarte [facility], dated today        HISTORY OF PRESENT ILLNESS   CCPS referral for psychiatric medication consult in October 2022.  Reports history of depression, anxiety and unspecified eating disorder (restricting and purging).  Denies prior psychiatric hospitalizations. No history of suicidal thoughts or attempts. No history of self-injurious behaviors. Genetically loaded for  depression, anxiety and remote history of bipolar. Grew up in an intact home with all basic needs being met.     Reports past diagnosis of the following in July 2021 psych evaluation with [facility] with a diagnosis of Generalized Anxiety Disorder, Major Depressive Disorder, unspecified eating disorder.  First sought treatment 2018 and does note depression is worse in the winter, seasonal component.     Pt endorses that she has experienced depression and anxiety symptoms since the start of college. Pt endorses that her school and work load are affecting her mental health and exacerbating symptoms.     FAMILY, MEDICAL, SURGICAL HISTORY REVIEWED.  MEDICATION HAVE BEEN REVIEWED AND ARE CURRENT TO THE BEST OF MY KNOWLEDGE AND ABILITY.  Student at [employer], , Studying counseling psychology at [employer] and reports being a full-time student. Pt also endorses have a full-time job at [employer], as a       [facility] [facility] [employer] [place],       MEDICATIONS                                                                                             "    Current Outpatient Medications   Medication Sig     desvenlafaxine (PRISTIQ) 25 MG 24 hr tablet Take 1 tablet (25 mg) by mouth daily Week one then increase to 50 mg (new script)     desvenlafaxine (PRISTIQ) 50 MG 24 hr tablet Take 1 tablet (50 mg) by mouth daily Week two and on.  If prior authorization needed, start it and also use goodRX     folic acid (FOLVITE) 1 MG tablet Take 1 tablet (1 mg) by mouth daily     sertraline (ZOLOFT) 50 MG tablet Take 1 tablet (50 mg) by mouth daily     No current facility-administered medications for this visit.      NOTES ABOUT CURRENT PSYCHOTROPIC MEDICATIONS: Pharmacogenomic testing completed and used in clinical decision making  Desvenlafaxine 50 mg, no side effects, has been on for approximately 3 weeks     PAST PSYCHOTROPIC MEDICATIONS:  Duloxetine, restless sleep, vivid dreams  Escitalopram, not effective  Sertraline, side effects   Trazodone   Hydroxyzine, \"zombie\"          TODAY PATIENT REPORTS THE FOLLOWING PSYCHIATRIC ROS:   Per [facility], Collette Ugarte, during today's team-based visit:  \"MH update: Pt reports that her symptoms have improved despite holidays being stressful. Feels motivated and productive. Pt reports that she's using a Lemus lamp for [place] which has helped her sleep. Pt reports that she's trying to have a healthier lifestyle including increased exercise and healthy eating. Pt reports she's been using skills she's learning in therapy. Symptoms stable: motivation, focus improved.   Stresses: No  Appetite: unremarkable  Sleep: unremarkable  Therapy: [facility] at [facility] weekly  NURIS: No  Preg: No  Most important: med review.\"     EXERCISE: Adequate  SIDE EFFECTS:   tolerating medications without reported side effects  COMPLIANCE:   states Adherent to medication regimen  REPORTS THE FOLLOWING NEW MEDICAL ISSUES:   none    PROBLEM: DEPRESSION: Improving now on desvenlafaxine and tolerating with no side effects   Last PHQ-9 1/5/2023   1.  Little " "interest or pleasure in doing things 0   2.  Feeling down, depressed, or hopeless 0   3.  Trouble falling or staying asleep, or sleeping too much 0   4.  Feeling tired or having little energy 1   5.  Poor appetite or overeating 0   6.  Feeling bad about yourself 1   7.  Trouble concentrating 0   8.  Moving slowly or restless 0   Q9: Thoughts of better off dead/self-harm past 2 weeks 0   PHQ-9 Total Score 2     PHQ-9 SCORE 9/12/2022 11/23/2022 1/5/2023   PHQ-9 Total Score MyChart 5 (Mild depression) 7 (Mild depression) 2 (Minimal depression)   PHQ-9 Total Score 5 7 2     PHQ9 score is 2 indicating minimal depression.  Suicidal ideation:  No     PROBLEM: ANXIETY: Improving.     GAD7 score is Not completed today  TRISHA-7 SCORE 9/12/2022   Total Score 2 (minimal anxiety)   Total Score 2          PERTINENT PAST MEDICAL AND SURGICAL HISTORY   No past medical history on file.    VITALS     BP Readings from Last 1 Encounters:   08/27/21 122/70     Pulse Readings from Last 1 Encounters:   08/27/21 70     Wt Readings from Last 1 Encounters:   08/27/21 87.1 kg (192 lb)     Ht Readings from Last 1 Encounters:   08/27/21 1.803 m (5' 11\")     Estimated body mass index is 26.78 kg/m  as calculated from the following:    Height as of 8/27/21: 1.803 m (5' 11\").    Weight as of 8/27/21: 87.1 kg (192 lb).    LABS & IMAGING                                                                                                                   Recent Labs   Lab Test 08/11/22  0905   WBC 5.1   HGB 14.3   HCT 41.7   MCV 85        Recent Labs   Lab Test 08/11/22  0905      POTASSIUM 4.0   CHLORIDE 106   CO2 25   GLC 91   NURY 9.0   BUN 17   CR 0.87   GFRESTIMATED >90   ALBUMIN 4.5   PROTTOTAL 7.2   AST 15   ALT 16   ALKPHOS 45   BILITOTAL 1.2        ALLERGY & IMMUNIZATIONS     No Known Allergies    MEDICAL REVIEW OF SYSTEMS:   Ten system review was completed with pertinent positives noted     MENTAL STATUS EXAM:    "   General/Constitutional:  Appearance:   awake, alert, adequately groomed, appeared stated age and no apparent distress  Attitude:    cooperative   Eye Contact:  good  Musculoskeletal:  Psychomotor Behavior:  no evidence of tardive dyskinesia, dystonia, or tics from the head up  Psychiatric:  Speech:  clear, coherent, regular rate, rhythm, and volume,   No pressure speech noted.  Associations:  no loose associations  Thought Process:   logical, linear and goal oriented  Thought Content:    No evidence of suicidal ideation or homicidal ideation, no evidence of psychotic thought, no auditory hallucinations present and no visual hallucinations present  Mood: better  Affect:  full range/stable (normal variation of emotions during exam) and was congruent to speech content.  Insight:  good  Judgment:  intact, adequate for safety  Impulse Control:  intact  Neurological:  Oriented to:  person, place, time, and situation  Attention Span and Concentration:  Able to attend to the interview      Language: intact     Recent and Remote Memory:  Intact to interview. Not formally assessed. No amnesia.    Fund of Knowledge: appropriate        SAFETY   Feels safe in home: YES     Suicidal ideation: Denies  History of suicide attempts:  No   Hx of impulsivity: No   Hope for the future: present    Hx of Command hallucinations or current psychosis: None endorsed    History of Self-injurious behaviors:    Family member  by suicide:  not discussed       SAFETY ASSESSMENT:   Based on all available evidence including the factors cited above, overall Risk for harm is low and is appropriate for outpatient level of care.   Recommended that patient call 911 or go to the local ED should there be a change in any of these risk factors.       DSM 5 DIAGNOSIS:   296.32 (F33.1) Major Depressive Disorder, Recurrent Episode, Moderate _  300.02 (F41.1) Generalized Anxiety Disorder      MEDICAL COMORBIDITY IMPACTING CLINICAL PICTURE: None noted.   Known issue that I take into account for their medical decisions, no current exacerbations or new concerns         ASSESSMENT AND PLAN      Problem List Items Addressed This Visit        Behavioral     Tolerating the desvenlafaxine 50 mg with positive improvement in mood and anxiety and not experiencing side effects.  Will continue current dose.  Prior authorization initiated.  Follow-up 4 weeks       Prior authorization inititated through covermymeds:Key: [id number] - [place] Case ID: [id number]         Moderate episode of recurrent major depressive disorder (H)    Relevant Medications    desvenlafaxine (PRISTIQ) 50 MG 24 hr tablet    TRISHA (generalized anxiety disorder)    Relevant Medications    desvenlafaxine (PRISTIQ) 50 MG 24 hr tablet        CONSULTS/REFERRALS:   Continue therapy   Coordinate care with therapist as needed     MEDICAL:   None at this time  Coordinate care with PCP (Francisca Mendez) as needed  Follow up with primary care provider as planned or for acute medical concerns.     PSYCHOEDUCATION:  Medication side effects and alternatives reviewed. Health promotion activities recommended and reviewed today. All questions addressed. Education and counseling completed regarding risks and benefits of medications and psychotherapy options.  Consent provided by patient/guardian  Call the psychiatric nurse line with medication questions or concerns at [phone].  Tumblr may be used to communicate with your provider, but this is not intended to be used for emergencies.  Medlineplus.gov is information for patients.  It is run by the National Library of Medicine and it contains information about all disorders, diseases and all medications.       COMMUNITY RESOURCES:    CRISIS NUMBERS: Provided in AVS 10/12/2022  National Suicide Prevention Lifeline: 7-154-753-TALK (119-648-4663)  [place] [employer] Mode/resources for a list of additional resources (SOS)            [facility]  [phone]   Urgent Care Adult Mental [place]-[phone] mobile unit/  crisis line  [facility] -[phone]   COPE  [place] Mobile Team -[phone] (adults)/ [phone] (child)  Poison Control Center - [phone]    OR  go to nearest ER  Crisis Text Line for any crisis  send this-   To: 227872   [facility] ([facility]) [facility]  [phone]  National Suicide Prevention Lifeline: 601.396.1223 (TTY: 328.304.3754). Call anytime for help.  (www.suicidepreventionlifeline.org)  National [place] on Mental Illness (www.victor[place].org): 232.367.1563 or 237-968-8512.   Mental Health Association (www.mentalhealth.org): 134.248.6252 or 135-860-5955.  [place] Crisis Text Line: Text [place] to 420729  Suicide LifeLine Chat: suicideMostro.org/chat         ADMINISTRATIVE BILLIN minutes spent interviewing patient, reviewing referral documents, obtaining and reviewing outside records, communication with other health specialists, and preparing this report on today's date    Video/Phone Start Time: 10:48 AM  Video/Phone End Time:  11:13    Patient Status:  Patient will continue to be seen for ongoing consultation and stabilization.    Signed:   Jami Lee, MSN, APRN, FMHNP-[facility]ative Care Psychiatry Service (CCPS)   Chart documentation done in part with Dragon Voice Recognition software.  Although reviewed after completion, some word and grammatical errors may remain.

## 2023-01-05 NOTE — ASSESSMENT & PLAN NOTE
Tolerating the desvenlafaxine 50 mg with positive improvement in mood and anxiety and not experiencing side effects.  Will continue current dose.  Prior authorization initiated.  Follow-up 4 weeks       Prior authorization inititated through covermymeds:Key: [id number] - [place] Case ID: [id number]

## 2023-01-06 RX ORDER — DESVENLAFAXINE 50 MG/1
50 TABLET, FILM COATED, EXTENDED RELEASE ORAL DAILY
Qty: 30 TABLET | Refills: 0 | Status: SHIPPED | OUTPATIENT
Start: 2023-01-06 | End: 2023-02-22

## 2023-01-06 NOTE — TELEPHONE ENCOUNTER
RN received notification from [employer].      1.  This notification indicated that [facility] Is requesting additional supporting evidence from Jami Lee CNP  for this patients Desvenlafaxine ER 50 mg tablet.     2.  The notification was faxed to the clinic 1/5/23 at 1:06 p.m.  The notification indicates that Provider Jami Lee CNP must respond to the letter before 1/6/23 at 03:08 hours.      3. EHR indicated that a Prior Authorization for this medication was denied 12/1/22    4. RN emailed this letter to Jami Lee CNP via secure phi.  Provider is not in the clinic today.      5.  RN placed patient labels on each of the 4 pages and faxed these to [facility].      6.  RN placed copy of these forms in Jami Lee's mailbox.

## 2023-01-09 ENCOUNTER — TELEPHONE (OUTPATIENT)
Dept: PSYCHIATRY | Facility: CLINIC | Age: 23
End: 2023-01-09

## 2023-01-09 NOTE — TELEPHONE ENCOUNTER
Prior Authorization request for desvenlafaxine succinate ER 50mg ER was approved. Faxed to pharmacy and scanned to medical records. Left  for patient.    Taya Ridley RN on 1/9/2023 at 11:25 AM

## 2023-01-23 ENCOUNTER — HEALTH MAINTENANCE LETTER (OUTPATIENT)
Age: 23
End: 2023-01-23

## 2023-02-22 DIAGNOSIS — F41.1 GAD (GENERALIZED ANXIETY DISORDER): ICD-10-CM

## 2023-02-22 DIAGNOSIS — F33.1 MODERATE EPISODE OF RECURRENT MAJOR DEPRESSIVE DISORDER (H): ICD-10-CM

## 2023-02-22 RX ORDER — DESVENLAFAXINE 50 MG/1
50 TABLET, FILM COATED, EXTENDED RELEASE ORAL DAILY
Qty: 30 TABLET | Refills: 1 | Status: SHIPPED | OUTPATIENT
Start: 2023-02-22 | End: 2023-04-07

## 2023-02-22 NOTE — TELEPHONE ENCOUNTER
Date of Last Office Visit: 01/05/2023  Date of Next Office Visit: NONE-[facility] Please reach out and help Patient schedule Follow-up with Jami Lee  No shows since last visit: None  Cancellations since last visit: None    Medication requested: desvenlafaxine (PRISTIQ) 50 MG 24 hr tablet Date last ordered: 01/06/2023 Qty: 30 Refills: 0     Review of [place] ?: Writer does not have access at this time.       Lapse in medication adherence greater than 5 days?: No  If yes, call patient and gather details: NA  Medication refill request verified as identical to current order?: Yes  Result of Last DAM, VPA, Li+ Level, CBC, or Carbamazepine Level (at or since last visit): N/A    Last visit treatment plan: 01/05/2023  TREATMENT PLAN:     MEDICATIONS:   - continue desvenlafaxine 50 mg        CONSULTS/REFERRALS:   Continue therapy      LABS/PROCEDURES: none at present   Please call your [place] clinic and ask for a lab only appointment at your earliest convenience.  If your results are reassuring or normal they will be mailed to you or sent through [facility] within 7 days. If the lab tests need quick action we will call you with the results. The phone number we will call with results is # [phone].        FOLLOW UP: Schedule an appointment with me in two months or sooner as needed.  The intake team should be calling you to schedule.  If you dont hear from them, or they were unable to reach you, please call [phone] to schedule.  Follow up with primary care provider as planned or for acute medical concerns.  Call the psychiatric nurse line with medication questions or concerns at [phone].    []Medication refilled per  Medication Refill in Ambulatory Care  policy.  [x]Medication unable to be refilled by RN due to criteria not met as indicated below:    []Eligibility - not seen in the last year   [x]Supervision - no future appointment   []Compliance - no shows, cancellations or lapse in therapy   []Verification - order  discrepancy   [x]Controlled medication   []Medication not included in policy   []90-day supply request   [x]Other CMA pending medication refill request

## 2023-03-02 ENCOUNTER — MYC MEDICAL ADVICE (OUTPATIENT)
Dept: PSYCHIATRY | Facility: CLINIC | Age: 23
End: 2023-03-02

## 2023-03-06 NOTE — TELEPHONE ENCOUNTER
Per [place]  She can start taking her desvenlafaxine, 2 tablets daily for a total of 100 mg daily.  Once Jami gets back, she can talk to her about getting a refill of the 100 mg dose.      Taya Ridley RN on 3/6/2023 at 11:10 AM

## 2023-04-07 DIAGNOSIS — F41.1 GAD (GENERALIZED ANXIETY DISORDER): ICD-10-CM

## 2023-04-07 DIAGNOSIS — F33.1 MODERATE EPISODE OF RECURRENT MAJOR DEPRESSIVE DISORDER (H): ICD-10-CM

## 2023-04-07 NOTE — TELEPHONE ENCOUNTER
1) RN received faxed refill request from [facility] in [place] for desvenlafaxine (PRISTIQ) 50 MG 24 hr tablet. Per 3/6/23 [facility] Advice encounter:         2) RN LVM at [phone] requesting pt call [phone] or send [facility] message with how many tablets she has left.     3) Cuing up refill for provider to fill in dosage details. To be routed once pt responds.     Janis Moya RN on 4/7/2023 at 4:58 PM  -----------------------------------------------------------------    Date of Last Office Visit: 1/5/23  Date of Next Office Visit: 4/14/23  No shows since last visit: 0  Cancellations since last visit: 0    Medication requested: desvenlafaxine (PRISTIQ) 50 MG 24 hr tablet Date last ordered: 2/22/23 Qty: 30 Refills: 1      [place] of [place] ?: [place] found on [place]  but no data available.     Lapse in medication adherence greater than 5 days?: no    Medication refill request verified as identical to current order?: yes; but provider potentially to increase to 100 MG tablets  Result of Last DAM, VPA, Li+ Level, CBC, or Carbamazepine Level (at or since last visit): N/A    Last visit treatment plan:     Behavioral-  Tolerating the desvenlafaxine 50 mg with positive improvement in mood and anxiety and not experiencing side effects.  Will continue current dose.  Prior authorization initiated.  Follow-up 4 weeks.     Moderate episode of recurrent major depressive disorder (H)    Relevant Medications    desvenlafaxine (PRISTIQ) 50 MG 24 hr tablet    TRISHA (generalized anxiety disorder)    Relevant Medications    desvenlafaxine (PRISTIQ) 50 MG 24 hr tablet     []Medication refilled per  Medication Refill in Ambulatory Care  policy.  [x]Medication unable to be refilled by RN due to criteria not met as indicated below:    []Eligibility - not seen in the last year   []Supervision - no future appointment   []Compliance - no shows, cancellations or lapse in therapy   [x]Verification - order discrepancy   [x]Controlled  medication   [x]Medication not included in policy   []90-day supply request   []Other

## 2023-04-10 NOTE — TELEPHONE ENCOUNTER
Second RF request for desvenlafaxine (PRISTIQ) 50 MG 24 hr tablet sent back to pharmacy prequesting a call from the pt.     Next visit is scheduled for 4/14/23

## 2023-04-12 RX ORDER — DESVENLAFAXINE 100 MG/1
100 TABLET, EXTENDED RELEASE ORAL DAILY
Qty: 30 TABLET | Refills: 0 | Status: SHIPPED | OUTPATIENT
Start: 2023-04-12 | End: 2023-04-14 | Stop reason: DRUGHIGH

## 2023-04-13 NOTE — PROGRESS NOTES
[facility]   2023      Behavioral Health Clinician Progress Note    Patient Name: Maria Antonia Marr           Service Type:  Individual      [place] [facility]   [facility] [place] (patient reached)     Session Start Time: 1030am  Session End Time: 1039am      Session Length: 9 min     Attendees: Patient     Service Modality:  Video Visit:      Provider verified identity through the following two step process.  Patient provided:  Patient  and Patient address    Telemedicine Visit: The patient's condition can be safely assessed and treated via synchronous audio and visual telemedicine encounter.      Reason for Telemedicine Visit: Services only offered telehealth    Originating Site (Patient Location): Patient's home    Distant Site (Provider Location): Provider Remote Setting- Home Office    Consent:  The patient/guardian has verbally consented to: the potential risks and benefits of telemedicine (video visit) versus in person care; bill my insurance or make self-payment for services provided; and responsibility for payment of non-covered services.     Patient would like the video invitation sent by:  My Chart    Mode of Communication:  Video Conference via [employer] to [facility]    Distant Location (Provider):  Off-site    As the provider I attest to compliance with applicable laws and regulations related to telemedicine.    Visit Activities (Refresh list every visit): [facility] Only    Diagnostic Assessment Date: 10/12/2022  Treatment Plan Review Date: 2023  See Flowsheets for today's PHQ-9 and TRISHA-7 results  Previous PHQ-9:       2022    10:19 AM 2023    10:27 AM 2023    10:27 AM   PHQ-9 SCORE   PHQ-9 Total Score MyChart 7 (Mild depression) 2 (Minimal depression) 2 (Minimal depression)   PHQ-9 Total Score 7 2 2     Previous TRISHA-7:       2022    11:08 AM   TRISHA-7 SCORE   Total Score 2 (minimal anxiety)   Total Score 2       PAT  LEVEL:       View : No data to display.                DATA  Extended Session (60+ minutes): No  Interactive Complexity: No  Crisis: No  BHH Patient: No    Treatment Objective(s) Addressed in This Session:  Target Behavior(s): improve motivation    Depressed Mood: Increase interest, engagement, and pleasure in doing things    Current Stressors / Issues:  MH update: Pt reports that her mood has been stable despite high stress. She's feeling burnt out at work and is in grad school for counseling and plans to go on for Ps for sports psychology. She reports good motivation and adequate energy. She's frustrated that she doesn't have time to exercise. 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 She has [employer] jobs in [employer] for the Summer.     Stresses: school, work  Appetite: unremarkable  Sleep: dreaming a lot which is different. Her Lemus clock has helped her sleep and waking up  Therapy: [facility] at [facility] weekly   NUIRS: No  Preg: No  Goals/progress on goals: improved motivation  Most important: med update      Progress on Treatment Objective(s) / Homework:  Stable - MAINTENANCE (Working to maintain change, with risk of relapse); Intervened by continuing to positively reinforce healthy behavior choice     Motivational Interviewing    MI Intervention: Co-Developed Goal: enhance motivation, Expressed Empathy/Understanding, Open-ended questions, Reflections: simple and complex, Change talk (evoked) and Reframe     Change Talk Expressed by the Patient: Desire to change Ability to change Reasons to change Need to change Committment to change Activation Taking steps    Provider Response to Change Talk: E - Evoked more info from patient about behavior change, A - Affirmed patient's thoughts, decisions, or attempts at behavior change, R - Reflected patient's change talk and S - Summarized patient's change talk statements      Care Plan review completed: Yes    Medication Review:  No changes to current psychiatric  medication(s)    Medication Compliance:  Yes    Changes in Health Issues:   None reported    Chemical Use Review:   Substance Use: Chemical use reviewed, no active concerns identified      Tobacco Use: No current tobacco use.      Assessment: Current Emotional / Mental Status (status of significant symptoms):  Risk status (Self / Other harm or suicidal ideation)  Patient denies a history of suicidal ideation, suicide attempts, self-injurious behavior, homicidal ideation, homicidal behavior and and other safety concerns  Patient denies current fears or concerns for personal safety.  Patient denies current or recent suicidal ideation or behaviors.  Patient denies current or recent homicidal ideation or behaviors.  Patient denies current or recent self injurious behavior or ideation.  Patient denies other safety concerns.  A safety and risk management plan has not been developed at this time, however patient was encouraged to call [place] [phone] should there be a change in any of these risk factors.    Appearance:   Appropriate   Eye Contact:   Good   Psychomotor Behavior: Normal   Attitude:   Cooperative   Orientation:   All  Speech   Rate / Production: Normal    Volume:  Normal   Mood:    Normal  Affect:    Appropriate   Thought Content:  Clear   Thought Form:  Coherent  Logical   Insight:    Good     Diagnoses:  1. TRISHA (generalized anxiety disorder)    2. Recurrent major depressive disorder, in partial remission (H)        Collateral Reports Completed:  Collaborated with CCPS team    Plan: (Homework, other):  Patient was given information about behavioral services and encouraged to schedule a follow up appointment with the clinic [facility] [place] in 1 month.  She was also given information about mental health symptoms and treatment options .  CD Recommendations: No indications of CD issues. [facility]         ______________________________________________________________________    Integrated Primary Care  Behavioral Health Treatment Plan    Patient's Name: Maria Antonia Marr  YOB: 2000    Date of Creation: 1/5/2023  Date Treatment Plan Last Reviewed/Revised: pending    DSM5 Diagnoses: 296.31 (F33.0) Major Depressive Disorder, Recurrent Episode, Mild _ and With anxious distress  Psychosocial / Contextual Factors: none identified  PROMIS (reviewed every 90 days): 11/23/2022 (28)    Referral / Collaboration:  Collaborated with CCPS team.    Anticipated number of session for this episode of care: 10-12  Anticipation frequency of session: Monthly  Anticipated Duration of each session: 16-37 minutes  Treatment plan will be reviewed in 90 days or when goals have been changed.       MeasurableTreatment Goal(s) related to diagnosis / functional impairment(s)  Goal 1: Patient will experience improved depression    I will know I've met my goal when I'm consistently motivated and focused.      Objective #A (Patient Action)    Patient will Decrease frequency and intensity of feeling down, depressed, hopeless.  Status: Continued - Date(s):7/5/2023     Intervention(s)  Therapist will teach emotional regulation skills. to manage depression.    Patient has reviewed and agreed to the above plan.      RAY Fisher  April 14, 2023

## 2023-04-14 ENCOUNTER — VIRTUAL VISIT (OUTPATIENT)
Dept: PSYCHIATRY | Facility: CLINIC | Age: 23
End: 2023-04-14
Payer: COMMERCIAL

## 2023-04-14 ENCOUNTER — VIRTUAL VISIT (OUTPATIENT)
Dept: BEHAVIORAL HEALTH | Facility: CLINIC | Age: 23
End: 2023-04-14
Payer: COMMERCIAL

## 2023-04-14 DIAGNOSIS — F33.41 RECURRENT MAJOR DEPRESSIVE DISORDER, IN PARTIAL REMISSION (H): ICD-10-CM

## 2023-04-14 DIAGNOSIS — F41.1 GAD (GENERALIZED ANXIETY DISORDER): Primary | ICD-10-CM

## 2023-04-14 DIAGNOSIS — F33.1 MODERATE EPISODE OF RECURRENT MAJOR DEPRESSIVE DISORDER (H): Primary | ICD-10-CM

## 2023-04-14 PROCEDURE — 99214 OFFICE O/P EST MOD 30 MIN: CPT | Mod: VID | Performed by: NURSE PRACTITIONER

## 2023-04-14 PROCEDURE — 99207 PR NO BILLABLE SERVICE THIS VISIT: CPT | Mod: VID | Performed by: SOCIAL WORKER

## 2023-04-14 RX ORDER — DESVENLAFAXINE 50 MG/1
50 TABLET, FILM COATED, EXTENDED RELEASE ORAL DAILY
Qty: 30 TABLET | Refills: 1 | Status: SHIPPED | OUTPATIENT
Start: 2023-04-14 | End: 2023-08-08

## 2023-04-14 RX ORDER — DESVENLAFAXINE 25 MG/1
25 TABLET, EXTENDED RELEASE ORAL DAILY
Qty: 30 TABLET | Refills: 1 | Status: SHIPPED | OUTPATIENT
Start: 2023-04-14 | End: 2023-08-08

## 2023-04-14 ASSESSMENT — PATIENT HEALTH QUESTIONNAIRE - PHQ9
10. IF YOU CHECKED OFF ANY PROBLEMS, HOW DIFFICULT HAVE THESE PROBLEMS MADE IT FOR YOU TO DO YOUR WORK, TAKE CARE OF THINGS AT HOME, OR GET ALONG WITH OTHER PEOPLE: NOT DIFFICULT AT ALL
SUM OF ALL RESPONSES TO PHQ QUESTIONS 1-9: 2
SUM OF ALL RESPONSES TO PHQ QUESTIONS 1-9: 2

## 2023-04-14 NOTE — PATIENT INSTRUCTIONS
"Patient Education   Collaborative Care Psychiatry Service  What to Expect  Here's what to expect from your Collaborative Care Psychiatry Service (CCPS).   About CCPS  CCPS means 2 people work together to help you get better. You'll meet with a behavioral health clinician and a psychiatric doctor. A behavioral health clinician helps people with mental health problems by talking with them. A psychiatric doctor helps people by giving them medicine.  How it works  At every visit, you'll see the behavioral health clinician (BHC) first. They'll talk with you about how you're doing and teach you how to feel better.   Then you'll see the psychiatric doctor. This doctor can help you deal with troubling thoughts and feelings by giving you medicine. They'll make sure you know the plan for your care.   CCPS usually takes 3 to 6 visits. If you need more visits, we may have you start seeing a different psychiatric doctor for ongoing care.  If you have any questions or concerns, we'll be glad to talk with you.  About visits  Be open  At your visits, please talk openly about your problems. It may feel hard, but it's the best way for us to help you.  Cancelling visits  If you can't come to your visit, please call us right away at [phone]. If you don't cancel at least 24 hours (1 full day) before your visit, that's \"late cancellation.\"  Being late to visits  Being very late is the same as not showing up. You will be a \"no show\" if:  Your appointment starts with a BHC, and you're more than 15 minutes late for a 30-minute (half hour) visit. This will also cancel your appointment with the psychiatric doctor.  Your appointment is with a psychiatric doctor only, and you're more than 15 minutes late for a 30-minute (half hour) visit.  Your appointment is with a psychiatric doctor only, and you're more than 30 minutes late for a 60-minute (full hour) visit.  If you cancel late or don't show up 2 times within 6 months, we may end your " care.   Getting help between visits  If you need help between visits, you can call us Monday to Friday from 8 a.m. to 4:30 p.m. at [phone].  Emergency care  Call 911 or go to the nearest emergency department if your life or someone else's life is in danger.  Call 988 anytime to reach the national Suicide and Crisis hotline.  Medicine refills  To refill your medicine, call your pharmacy. You can also call [facility] at [phone], Monday to Friday, 8 a.m. to 4:30 p.m. It can take 1 to 3 business days to get a refill.   Forms, letters, and tests  You may have papers to fill out, like FMLA, short-term disability, and workability. We can help you with these forms at your visits, but you must have an appointment. You may need more than 1 visit for this, to be in an intensive therapy program, or both.  Before we can give you medicine for ADHD, we may refer you to get tested for it or confirm it another way.  We may not be able to give you an emotional support animal letter.  We don't do mental health checks ordered by the court.   We don't do mental health testing, but we can refer you to get tested.   Thank you for choosing us for your care.    **For crisis resources, please see the information at the end of this document**     Thank you for coming to the [facility] [place] [facility].    TREATMENT PLAN:    MEDICATIONS:   - decrease the desvenlafaxine to 75 mg    CONSULTS/REFERRALS:   Continue therapy     LABS/PROCEDURES:    Please call your [place] clinic and ask for a lab only appointment at your earliest convenience.  If your results are reassuring or normal they will be mailed to you or sent through [employer] within 7 days. If the lab tests need quick action we will call you with the results. The phone number we will call with results is # [phone].      FOLLOW UP: Schedule an appointment with me in six weeks  or sooner as needed.  The intake team  should be calling you to schedule.  If you dont hear from them, or they were unable to reach you, please call [phone] to schedule.  Follow up with primary care provider as planned or for acute medical concerns.  Call the psychiatric nurse line with medication questions or concerns at [phone].      Medication Refills:  If you need any refills please call your pharmacy and they will contact us. Our fax number for refills is [phone]. Please allow three business for refill processing. If you need to  your refill at a new pharmacy, please contact the new pharmacy directly. The new pharmacy will help you get your medications transferred.     [facility] Assistance [phone]  Financial Assistance [phone]  [facility] Billing [phone]  Central Billing Office, [facility]: [phone]  [place] Billing [phone]  Medical Records [phone]  [place] Patient Bill of Rights [email or web address]       MENTAL HEALTH CRISIS RESOURCES:  For a emergency help, please call 911 or go to the nearest Emergency Department.     Emergency Walk-In Options:   EmPATH Unit @ [place] [facility] ([place]): [phone] - Specialized mental health emergency area designed to be [facility]  [facility] ([place]): [phone]  [facility] ([place]): [phone]  [facility]): [phone]    National Crisis Information: Call 988 Suicide and Crisis Lifeline  Crisis Text Line (free 24/7):  call **CRISIS (**679638) Crisis or use the texting option by texting 653381.   National Suicide Prevention Lifeline: Call 988  Poison Control Center: 1-332-595-6285  Trans Lifeline: 2-020-807-9208 - Hotline for transgender people of all ages  The Jerry Project: 1-392.340.9193 - Hotline for LGBT youth   List of all [facility] resources:    https://[place].gov/dhs/people-we-serve/adults/health-care/mental-health/resources/crisis-contacts.jsp    For Non-Emergency Support:   Fast Tracker: Mental Health & Substance Use Disorder Resources -   https://www.fasttrackermn.org/       Again thank you for choosing [facility] M[facility]AL HE[facility] & [facility] and please let us know how we can best partner with you to improve you and your family's health.    You may be receiving a survey regarding this appointment. We would love to have your feedback, both positive and negative. The survey is done by an external company, so your answers are anonymous.    For informational purposes only. Not to replace the advice of your health care provider. Copyright   2022 [facility]. All rights reserved. [employer] 978309 - 12/22.

## 2023-04-14 NOTE — NURSING NOTE
Is the patient currently in the state of [place]? YES    Visit mode:VIDEO    If the visit is dropped, the patient can be reconnected by: VIDEO VISIT: Text to cell phone: [phone]    Will anyone else be joining the visit? NO      How would you like to obtain your AVS? MyChart    Are changes needed to the allergy or medication list? NO    Reason for visit: Video Visit

## 2023-04-14 NOTE — PROGRESS NOTES
Virtual Visit Details    Type of service:  Video Visit   Video Start Time: 10:50 AM  Video End Time:11:16    Originating Location (pt. Location): Home  Distant Location (provider location):  Off-site  Platform used for Video Visit: UpdateLogic          PSYCHIATRIC MEDICATION FOLLOW UP APPT     Name:  Maria Antonia Marr  : 2000    Maria Antonia Marr is a 23 year old female who is being evaluated via a billable video visit.          Telemedicine Visit: The patient's condition can be safely assessed and treated via synchronous audio and visual telemedicine encounter.       Reason for Telemedicine Visit: COVID 19 pandemic and the social and physical recommendations by the CDC and [facility].           Consent:  The patient/guardian has verbally consented to: the potential risks and benefits of telemedicine (video visit or phone) versus in person care; bill my insurance or make self-payment for services provided; and responsibility for payment of non-covered services.          As the provider I attest to compliance with applicable laws and regulations related to telemedicine.     IDENTIFICATION   Referred by: Francisca Mendez MD [facility]   Patient Care Team:  Francisca Mendez MD as PCP - General (Internal Medicine)  Francisca Mendez MD as Assigned PCP  Jitendra Greene MD as Fellow  Therapist: currently [facility]      Patient attended the phone/video session alone.    Last seen for outpatient psychiatry Return Visit on 1/15/2023.      FOLLOWING PLAN PUT INTO PLACE:   Tolerating the desvenlafaxine 50 mg with positive improvement in mood and anxiety and not experiencing side effects.  Will continue current dose.  Prior authorization initiated.  Follow-up 4 weeks         Prior authorization inititated through covermymeds:Key: [id number] - [place] Case ID: [id number]    INTERIM HISTORY     COMMUNICATIONS FROM PATIENT VIA: none    RECORDS AVAILABLE FOR REVIEW: EHR records through [employer]  and previous  psychiatric progress note.  In addition, reviewed the assessment completed by Collette Ugarte [facility], dated today        HISTORY OF PRESENT ILLNESS   CCPS referral for psychiatric medication consult in October 2022.  Reports history of depression, anxiety and unspecified eating disorder (restricting and purging).  Denies prior psychiatric hospitalizations. No history of suicidal thoughts or attempts. No history of self-injurious behaviors. Genetically loaded for  depression, anxiety and remote history of bipolar. Grew up in an intact home with all basic needs being met.     Reports past diagnosis of the following in July 2021 psych evaluation with [facility] with a diagnosis of Generalized Anxiety Disorder, Major Depressive Disorder, unspecified eating disorder.  First sought treatment 2018 and does note depression is worse in the winter, seasonal component.     Pt endorses that she has experienced depression and anxiety symptoms since the start of college. Pt endorses that her school and work load are affecting her mental health and exacerbating symptoms.     FAMILY, MEDICAL, SURGICAL HISTORY REVIEWED.  MEDICATION HAVE BEEN REVIEWED AND ARE CURRENT TO THE BEST OF MY KNOWLEDGE AND ABILITY.  Student at [employer], , Studying counseling psychology at [employer] and reports being a full-time student. Pt also endorses have a full-time job at [employer], as a       [employer]       MEDICATIONS                                                                                                Current Outpatient Medications   Medication Sig     desvenlafaxine (PRISTIQ) 100 MG 24 hr tablet Take 1 tablet (100 mg) by mouth daily     folic acid (FOLVITE) 1 MG tablet Take 1 tablet (1 mg) by mouth daily     No current facility-administered medications for this visit.      NOTES ABOUT CURRENT PSYCHOTROPIC MEDICATIONS: Pharmacogenomic testing completed and used in  "clinical decision making  Desvenlafaxine 100 mg, no side effects, increased approximately 5 weeks ago, possible increase in vivid dreams.     PAST PSYCHOTROPIC MEDICATIONS:  Duloxetine, restless sleep, vivid dreams  Escitalopram, not effective  Sertraline, side effects   Trazodone   Hydroxyzine, \"zombie\"          TODAY PATIENT REPORTS THE FOLLOWING PSYCHIATRIC ROS:   Per [facility], Collette Ugarte, during today's team-based visit:  \"MH update: Pt reports that her mood has been stable despite high stress. She's feeling burnt out at work and is in grad school for counseling and plans to go on for [employer] for sports psychology. She reports good motivation and adequate energy. She's frustrated that she doesn't have time to exercise. 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 She has [employer] jobs in for the Summer.     Stresses: school, work  Appetite: unremarkable  Sleep: dreaming a lot which is different. Her Lemus clock has helped her sleep and waking up  Therapy: [facility] at [facility] weekly   NURIS: No  Preg: No  Goals/progress on goals: improved motivation  Most important: med update\"     EXERCISE: Adequate  SIDE EFFECTS:   tolerating medications without reported side effects  COMPLIANCE:   states Adherent to medication regimen  REPORTS THE FOLLOWING NEW MEDICAL ISSUES:   none  The Pristiq at the 50 because it was working andPROBLEM: DEPRESSION: Improving now on desvenlafaxine but concern the vivid dreams are a side effect when increase to 100 mg      4/14/2023    10:27 AM   Last PHQ-9   1.  Little interest or pleasure in doing things 1   2.  Feeling down, depressed, or hopeless 0   3.  Trouble falling or staying asleep, or sleeping too much 0   4.  Feeling tired or having little energy 1   5.  Poor appetite or overeating 0   6.  Feeling bad about yourself 0   7.  Trouble concentrating 0   8.  Moving slowly or restless 0   Q9: Thoughts of better off dead/self-harm past 2 weeks 0   PHQ-9 Total Score 2         11/23/2022    " "10:19 AM 1/5/2023    10:27 AM 4/14/2023    10:27 AM   PHQ-9 SCORE   PHQ-9 Total Score MyChart 7 (Mild depression) 2 (Minimal depression) 2 (Minimal depression)   PHQ-9 Total Score 7 2 2     PHQ9 score is 2 indicating minimal depression.  Suicidal ideation:  No     PROBLEM: ANXIETY: Feels the current medication regimen is effective.  .          PERTINENT PAST MEDICAL AND SURGICAL HISTORY   No past medical history on file.    VITALS     BP Readings from Last 1 Encounters:   08/27/21 122/70     Pulse Readings from Last 1 Encounters:   08/27/21 70     Wt Readings from Last 1 Encounters:   08/27/21 87.1 kg (192 lb)     Ht Readings from Last 1 Encounters:   08/27/21 1.803 m (5' 11\")     Estimated body mass index is 26.78 kg/m  as calculated from the following:    Height as of 8/27/21: 1.803 m (5' 11\").    Weight as of 8/27/21: 87.1 kg (192 lb).    LABS & IMAGING                                                                                                                   Recent Labs   Lab Test 08/11/22  0905   WBC 5.1   HGB 14.3   HCT 41.7   MCV 85        Recent Labs   Lab Test 08/11/22  0905      POTASSIUM 4.0   CHLORIDE 106   CO2 25   GLC 91   NURY 9.0   BUN 17   CR 0.87   GFRESTIMATED >90   ALBUMIN 4.5   PROTTOTAL 7.2   AST 15   ALT 16   ALKPHOS 45   BILITOTAL 1.2        ALLERGY & IMMUNIZATIONS     No Known Allergies    MEDICAL REVIEW OF SYSTEMS:   Ten system review was completed with pertinent positives noted     MENTAL STATUS EXAM:      General/Constitutional:  Appearance:   awake, alert, adequately groomed, appeared stated age and no apparent distress  Attitude:    cooperative   Eye Contact:  good  Musculoskeletal:  Psychomotor Behavior:  no evidence of tardive dyskinesia, dystonia, or tics from the head up  Psychiatric:  Speech:  clear, coherent, regular rate, rhythm, and volume,   No pressure speech noted.  Associations:  no loose associations  Thought Process:   logical, linear and goal " oriented  Thought Content:    No evidence of suicidal ideation or homicidal ideation, no evidence of psychotic thought, no auditory hallucinations present and no visual hallucinations present  Mood: good  Affect:  full range/stable (normal variation of emotions during exam) and was congruent to speech content.  Insight:  good  Judgment:  intact, adequate for safety  Impulse Control:  intact  Neurological:  Oriented to:  person, place, time, and situation  Attention Span and Concentration:  Able to attend to the interview      Language: intact     Recent and Remote Memory:  Intact to interview. Not formally assessed. No amnesia.    Fund of Knowledge: appropriate        SAFETY   Feels safe in home: YES     Suicidal ideation: Denies  History of suicide attempts:  No   Hx of impulsivity: No   Hope for the future: present    Hx of Command hallucinations or current psychosis: None endorsed    History of Self-injurious behaviors:    Family member  by suicide:  not discussed       SAFETY ASSESSMENT:   Based on all available evidence including the factors cited above, overall Risk for harm is low and is appropriate for outpatient level of care.   Recommended that patient call 911 or go to the local ED should there be a change in any of these risk factors.       DSM 5 DIAGNOSIS:   296.32 (F33.1) Major Depressive Disorder, Recurrent Episode, Moderate _  300.02 (F41.1) Generalized Anxiety Disorder      MEDICAL COMORBIDITY IMPACTING CLINICAL PICTURE: None noted.  Known issue that I take into account for their medical decisions, no current exacerbations or new concerns         ASSESSMENT AND PLAN       Problem List Items Addressed This Visit        Behavioral     Overall doing well with the exception of psychosocial stressors.  The increase in desvenlafaxine to 100 mg may have increased vivid dreams.  Will decrease the desvenlafaxine to 75 mg to evaluate if side effects are dose dependant.  Follow-up six weeks           Moderate episode of recurrent major depressive disorder (H) - Primary    Relevant Medications    desvenlafaxine (PRISTIQ) 50 MG 24 hr tablet    desvenlafaxine succinate (PRISTIQ) 25 MG 24 hr tablet        CONSULTS/REFERRALS:   Continue therapy   Coordinate care with therapist as needed     MEDICAL:   None at this time  Coordinate care with PCP (Francisca Mendez) as needed  Follow up with primary care provider as planned or for acute medical concerns.     PSYCHOEDUCATION:  Medication side effects and alternatives reviewed. Health promotion activities recommended and reviewed today. All questions addressed. Education and counseling completed regarding risks and benefits of medications and psychotherapy options.  Consent provided by patient/guardian  Call the psychiatric nurse line with medication questions or concerns at [phone].  [employer] may be used to communicate with your provider, but this is not intended to be used for emergencies.  24h00.gov is information for patients.  It is run by the [employer] and it contains information about all disorders, diseases and all medications.       COMMUNITY RESOURCES:    CRISIS NUMBERS: Provided in AVS 10/12/2022  National Suicide Prevention Lifeline: 3-102-700-TALK (672-998-1031)  [employer]/resources for a list of additional resources (SOS)            [facility] - [phone]   Urgent Care Adult Mental Xmesxw-[phone] mobile unit/ 24/7 crisis line  [facility] -[phone]   COPE 24/7 [place] Mobile Team -[phone] (adults)/ [phone] (child)  Poison Control Center - 1-895.468.8038    OR  go to nearest ER  Crisis Text Line for any crisis 24/7 send this-   To: 871876   [facility] ER  [phone]  National Suicide Prevention Lifeline: 770.677.8864 (TTY: 470.439.2080). Call anytime for help.  (www.suicidepreventionlifeline.org)  National [place] on Mental Illness  (www.victorino.org): 661-665-8103 or 848-354-1777.   Mental Health Association (www.mentalhealth.org): 595.148.3970 or 541-154-2205.  [place] Crisis Text Line: Text [place] to 504381  Suicide LifeLine Chat: suicidepreventionDinamundoline.org/chat         ADMINISTRATIVE BILLIN minutes spent interviewing patient, reviewing referral documents, obtaining and reviewing outside records, communication with other health specialists, and preparing this report on today's date    Patient Status:  Patient will continue to be seen for ongoing consultation and stabilization.    Signed:   Jami Lee, MSN, APRN, FMHNP-[facility] Collaborative Care Psychiatry Service (CCPS)   Chart documentation done in part with Dragon Voice Recognition software.  Although reviewed after completion, some word and grammatical errors may remain.

## 2023-04-14 NOTE — ASSESSMENT & PLAN NOTE
Overall doing well with the exception of psychosocial stressors.  The increase in desvenlafaxine to 100 mg may have increased vivid dreams.  Will decrease the desvenlafaxine to 75 mg to evaluate if side effects are dose dependant.  Follow-up six weeks

## 2023-05-05 ENCOUNTER — MYC MEDICAL ADVICE (OUTPATIENT)
Dept: PSYCHIATRY | Facility: CLINIC | Age: 23
End: 2023-05-05
Payer: COMMERCIAL

## 2023-05-05 NOTE — LETTER
5/5/2023    RE: Maria Antonia Marr  [address] [place] [address]        Dear Nilay,  I am a Psychiatric Nurse Practioner with Mental health with the  [facility] Psychiatry Services team caring for Maria Antonia.  I am writing to confirm that Maria Antonia is a person with a disability and requires an emotional support animal to fully use and enjoy their housing.  It is my understanding that they are requesting to have an emotional support animal in their rental housing.  The Patient has a relevant diagnosis or diagnoses of:  Major Depressive Disorder and Generalized Anxiety Disorder. The associated impairments impact some of the Patient s major life activities, including but not limited to caring for self, sleeping, thinking, communicating, and working.  An emotional support animal will allow the Patient to fully enjoy and use their home because:   *The companionship of the animal will alleviate fears of being alone.  *The companionship of the animal will ease the stress of social interactions.  *The companionship of the animal will alleviate or calm their mood.  *The need to care for the animal will motivate them to engage in self-care.  *The need to care for the animal will provide a sense of purpose.  *The need to take the animal outside will motivate them to leave the apartment and interact with others.    This individual does not have impairments that would prevent them from caring for a emotional service animal.  It should be understood that an emotional service animal does not exempt the pet owner from any damage caused by the pet.    Sincerely,    Jami Lee DNP, FMPERDOP-BC,   Seema LEVY          Sincerely,

## 2023-05-05 NOTE — TELEPHONE ENCOUNTER
Patient requesting an RC form for her apartment complex. Wondering if we would be willing to fill it out for her.     Taya Ridley RN on 5/5/2023 at 9:41 AM

## 2023-05-08 NOTE — TELEPHONE ENCOUNTER
RN attempted to call this patient to determine how she wanted the clinic to manage her letter written by Jami Lee CNP.  There was no answer.  RN LVM for the patient to please call [phone] to give the clinic instructions how to proceed.  Awaiting call back.

## 2023-06-06 NOTE — TELEPHONE ENCOUNTER
Provider wanted us to know that letter for support animal is done. She has not responded to phone calls or My chart regarding if she needed it sent somewhere.   Left another voicemail for patient.     Per Jami: Can you let her know I have written a letter and that should suffice for her.  Thank you!

## 2023-06-26 NOTE — TELEPHONE ENCOUNTER
Letter queue for this patient appeared related to:     This Communication is not Ready to Send    Attached letter contains unresolved variables ('**') or SmartLists.     RN sent a CXOWARE message to patient to assess if she had reviewed the letter and had further needs.      Routing to provider for review upon return to office for additional data in letter.     Letter dated 5/5/23.    Belinda Castellanos RN on 6/26/2023 at 8:11 AM

## 2023-07-05 NOTE — TELEPHONE ENCOUNTER
I went into the letter and corrected to make it complete.  It should have sent to Maria Antonia.  Thank you!     Jami Lee, DOE, APRN, FMHNP-BC,

## 2023-08-08 ENCOUNTER — MYC REFILL (OUTPATIENT)
Dept: PSYCHIATRY | Facility: CLINIC | Age: 23
End: 2023-08-08
Payer: COMMERCIAL

## 2023-08-08 DIAGNOSIS — F33.1 MODERATE EPISODE OF RECURRENT MAJOR DEPRESSIVE DISORDER (H): ICD-10-CM

## 2023-08-08 RX ORDER — DESVENLAFAXINE 25 MG/1
25 TABLET, EXTENDED RELEASE ORAL DAILY
Qty: 30 TABLET | Refills: 0 | Status: SHIPPED | OUTPATIENT
Start: 2023-08-08 | End: 2023-09-06

## 2023-08-08 RX ORDER — DESVENLAFAXINE 50 MG/1
50 TABLET, FILM COATED, EXTENDED RELEASE ORAL DAILY
Qty: 30 TABLET | Refills: 0 | Status: SHIPPED | OUTPATIENT
Start: 2023-08-08 | End: 2023-09-06

## 2023-08-08 NOTE — CONFIDENTIAL NOTE
BHA please call patient to schedule a follow up appointment  Date of Last Office Visit: 04/14/2023  Date of Next Office Visit: NONE  No shows since last visit: 0  Cancellations since last visit: 0    Medication requested: desvenlafaxine succinate (PRISTIQ) 50 MG 24 hr tablet  Date last ordered: 04/14/2023 Qty: 30 Refills: 1    Medication requested: desvenlafaxine succinate (PRISTIQ) 25 MG 24 hr tablet  Date last ordered: 04/14/2023 Qty: 30 Refills: 1     Review of [place] ?: N/A  Lapse in medication adherence greater than 5 days?: Yes  If yes, call patient and gather details: VM left for patient to call the clinic back   Medication refill request verified as identical to current order?: yes  Result of Last DAM, VPA, Li+ Level, CBC, or Carbamazepine Level (at or since last visit): N/A    Last visit treatment plan:   Return in about 6 weeks (around 5/26/2023).     Overall doing well with the exception of psychosocial stressors.  The increase in desvenlafaxine to 100 mg may have increased vivid dreams.  Will decrease the desvenlafaxine to 75 mg to evaluate if side effects are dose dependant.  Follow-up six weeks           []Medication refilled per  Medication Refill in Ambulatory Care  policy.  []Medication unable to be refilled by RN due to criteria not met as indicated below:    []Eligibility - not seen in the last year   [x]Supervision - no future appointment   []Compliance - no shows, cancellations or lapse in therapy   []Verification - order discrepancy   []Controlled medication   [x]Medication not included in policy   []90-day supply request   [x]Other

## 2023-08-09 ENCOUNTER — TELEPHONE (OUTPATIENT)
Dept: PSYCHIATRY | Facility: CLINIC | Age: 23
End: 2023-08-09
Payer: COMMERCIAL

## 2023-08-09 NOTE — TELEPHONE ENCOUNTER
Reason for call:  Other   Patient called regarding (reason for call): call back  Additional comments: Pt stated she had a call from a nurse yesterday about a problem at the pharmacy with one of her meds. Nurse unavailable when she called back.    Phone number to reach patient:  Home number on file [phone] (home)    Best Time:  any    Can we leave a detailed message on this number?  YES    Travel screening: Not Applicable

## 2023-09-06 ENCOUNTER — VIRTUAL VISIT (OUTPATIENT)
Dept: PSYCHIATRY | Facility: CLINIC | Age: 23
End: 2023-09-06
Payer: COMMERCIAL

## 2023-09-06 DIAGNOSIS — F33.1 MODERATE EPISODE OF RECURRENT MAJOR DEPRESSIVE DISORDER (H): ICD-10-CM

## 2023-09-06 PROCEDURE — 99214 OFFICE O/P EST MOD 30 MIN: CPT | Mod: VID | Performed by: NURSE PRACTITIONER

## 2023-09-06 RX ORDER — DESVENLAFAXINE 25 MG/1
25 TABLET, EXTENDED RELEASE ORAL DAILY
Qty: 90 TABLET | Refills: 1 | Status: SHIPPED | OUTPATIENT
Start: 2023-09-06

## 2023-09-06 RX ORDER — DESVENLAFAXINE 50 MG/1
50 TABLET, FILM COATED, EXTENDED RELEASE ORAL DAILY
Qty: 90 TABLET | Refills: 1 | Status: SHIPPED | OUTPATIENT
Start: 2023-09-06

## 2023-09-06 NOTE — PATIENT INSTRUCTIONS
**For crisis resources, please see the information at the end of this document**     [facility] MENTAL HEALTH & ADDICTION [facility].      Thank you for our work together in the Psychiatry Collaborative Care Model at [facility]. This is our last visit and I am returning your care back to your Primary Care Provider Awilda Chavez PA-C . If you are not doing well, please contact your Primary Care Provider office.    MEDICATIONS:   - The current medical regimen is effective; continue present plan and medications.     MyChart Assistance [phone]  Financial Assistance [phone]  [facility] Billing [phone]  Central Billing Office, [facility]: [phone]  [place] Billing [phone]  Medical Records [phone]  [place] Patient Bill of Rights [email or web address]       MENTAL HEALTH CRISIS RESOURCES:  For a emergency help, please call 911 or go to the nearest Emergency Department.     Emergency Walk-In Options:   EmPATH Unit @ [facility] ([place]): [phone] - Specialized mental health emergency area designed to be calming  [facility] (Mobile): [phone]  [facility] Services (Mobile): [phone]  [facility]): [phone]    National Crisis Information: Call 988 Suicide and Crisis Lifeline  Crisis Text Line (free 24/7):  call **CRISIS (**531874) Crisis or use the texting option by texting 206521.   National Suicide Prevention Lifeline: Call 988  Poison Control Center: 0-572-496-3903  Trans Lifeline: 7-067-027-6697 - Hotline for transgender people of all ages  The Jerry Project: 9-607-200-7162 - Hotline for LGBT youth   List of all [facility] resources:   https://[place].gov/dhs/people-we-serve/adults/health-care/mental-health/resources/crisis-contacts.jsp    For Non-Emergency Support:   Fast Tracker: Mental Health & Substance Use Disorder  Resources -   https://www.CheckckInuvon.org/       Again thank you for choosing [facility] MENTAL HEALTH & ADDICTION [facility] and please let us know how we can best partner with you to improve you and your family's health.    You may be receiving a survey regarding this appointment. We would love to have your feedback, both positive and negative. The survey is done by an external company, so your answers are anonymous.

## 2023-09-06 NOTE — PROGRESS NOTES
Virtual Visit Details    Type of service:  Video Visit   Video Start Time:  2:30 pm  Video End Time:2:47 PM    Originating Location (pt. Location): Other work    Distant Location (provider location):  Off-site  Platform used for Video Visit: [employer]         PSYCHIATRIC MEDICATION FOLLOW UP APPT     Name:  Maria Antonia Marr  : 2000        Referred by: Francisca Mendez MD [facility]   Patient Care Team:  Francisca Mendez MD as PCP - General (Internal Medicine)  Francisca Mendez MD as Assigned PCP  Jitendra Greene MD as Fellow  Therapist: currently [facility]      Patient attended the phone/video session alone.    Last seen for outpatient psychiatry Return Visit on 2023.      FOLLOWING PLAN PUT INTO PLACE:   Overall doing well with the exception of psychosocial stressors.  The increase in desvenlafaxine to 100 mg may have increased vivid dreams.  Will decrease the desvenlafaxine to 75 mg to evaluate if side effects are dose dependant.  Follow-up six weeks     INTERIM HISTORY     COMMUNICATIONS FROM PATIENT VIA: none    RECORDS AVAILABLE FOR REVIEW: EHR records through [employer]  and previous psychiatric progress note.  In addition, reviewed the assessment completed by [facility], dated today        HISTORY OF PRESENT ILLNESS   CCPS referral for psychiatric medication consult in 2022.  Reports history of depression, anxiety and unspecified eating disorder (restricting and purging).  Denies prior psychiatric hospitalizations. No history of suicidal thoughts or attempts. No history of self-injurious behaviors. Genetically loaded for  depression, anxiety and remote history of bipolar. Grew up in an intact home with all basic needs being met.     Reports past diagnosis of the following in 2021 psych evaluation with [facility] with a diagnosis of Generalized Anxiety Disorder, Major Depressive Disorder, unspecified eating disorder.  First sought treatment  and does note  "depression is worse in the winter, seasonal component.     Pt endorses that she has experienced depression and anxiety symptoms since the start of college. Pt endorses that her school and work load are affecting her mental health and exacerbating symptoms.     FAMILY, MEDICAL, SURGICAL HISTORY REVIEWED.  MEDICATION HAVE BEEN REVIEWED AND ARE CURRENT TO THE BEST OF MY KNOWLEDGE AND ABILITY.  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       MEDICATIONS                                                                                                Current Outpatient Medications   Medication Sig    desvenlafaxine (PRISTIQ) 100 MG 24 hr tablet Take 1 tablet (100 mg) by mouth daily    folic acid (FOLVITE) 1 MG tablet Take 1 tablet (1 mg) by mouth daily     No current facility-administered medications for this visit.      NOTES ABOUT CURRENT PSYCHOTROPIC MEDICATIONS: Pharmacogenomic testing completed and used in clinical decision making  Desvenlafaxine 75 mg     PAST PSYCHOTROPIC MEDICATIONS:  Duloxetine, restless sleep, vivid dreams  Escitalopram, not effective  Sertraline, side effects   Trazodone   Hydroxyzine, \"zombie\"          TODAY PATIENT REPORTS THE FOLLOWING PSYCHIATRIC ROS:   Mood is good. 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  Continues with symptoms of fatigue and sleep impairment that is challenging but not distressing.  Reports she willl be seeing a gynecologist to evaluate.    EXERCISE: Adequate  SIDE EFFECTS:   tolerating medications without reported side effects  COMPLIANCE:   states Adherent to medication regimen  REPORTS THE FOLLOWING NEW MEDICAL ISSUES:   none    PROBLEM: DEPRESSION:  Feels the current medication regimen is effective. Denies vegetative symptoms of depression.         4/14/2023    10:27 " "AM   Last PHQ-9   1.  Little interest or pleasure in doing things 1   2.  Feeling down, depressed, or hopeless 0   3.  Trouble falling or staying asleep, or sleeping too much 0   4.  Feeling tired or having little energy 1   5.  Poor appetite or overeating 0   6.  Feeling bad about yourself 0   7.  Trouble concentrating 0   8.  Moving slowly or restless 0   Q9: Thoughts of better off dead/self-harm past 2 weeks 0   PHQ-9 Total Score 2         11/23/2022    10:19 AM 1/5/2023    10:27 AM 4/14/2023    10:27 AM   PHQ-9 SCORE   PHQ-9 Total Score MyChart 7 (Mild depression) 2 (Minimal depression) 2 (Minimal depression)   PHQ-9 Total Score 7 2 2     PHQ9 score is Not completed today  Suicidal ideation:  No     PROBLEM: ANXIETY: Feels the current medication regimen is effective.  .          PERTINENT PAST MEDICAL AND SURGICAL HISTORY   No past medical history on file.    VITALS     BP Readings from Last 1 Encounters:   08/27/21 122/70     Pulse Readings from Last 1 Encounters:   08/27/21 70     Wt Readings from Last 1 Encounters:   08/27/21 87.1 kg (192 lb)     Ht Readings from Last 1 Encounters:   08/27/21 1.803 m (5' 11\")     Estimated body mass index is 26.78 kg/m  as calculated from the following:    Height as of 8/27/21: 1.803 m (5' 11\").    Weight as of 8/27/21: 87.1 kg (192 lb).    LABS & IMAGING                                                                                                                   Recent Labs   Lab Test 08/11/22  0905   WBC 5.1   HGB 14.3   HCT 41.7   MCV 85        Recent Labs   Lab Test 08/11/22  0905      POTASSIUM 4.0   CHLORIDE 106   CO2 25   GLC 91   NURY 9.0   BUN 17   CR 0.87   GFRESTIMATED >90   ALBUMIN 4.5   PROTTOTAL 7.2   AST 15   ALT 16   ALKPHOS 45   BILITOTAL 1.2        ALLERGY & IMMUNIZATIONS     No Known Allergies    MEDICAL REVIEW OF SYSTEMS:   Ten system review was completed with pertinent positives noted     MENTAL STATUS EXAM:    "   General/Constitutional:  Appearance:   awake, alert, adequately groomed, appeared stated age and no apparent distress  Attitude:    cooperative   Eye Contact:  good  Musculoskeletal:  Psychomotor Behavior:  no evidence of tardive dyskinesia, dystonia, or tics from the head up  Psychiatric:  Speech:  clear, coherent, regular rate, rhythm, and volume,   No pressure speech noted.  Associations:  no loose associations  Thought Process:   logical, linear and goal oriented  Thought Content:    No evidence of suicidal ideation or homicidal ideation, no evidence of psychotic thought, no auditory hallucinations present and no visual hallucinations present  Mood: good   Affect:  full range/stable (normal variation of emotions during exam) and was congruent to speech content.  Insight:  good  Judgment:  intact, adequate for safety  Impulse Control:  intact  Neurological:  Oriented to:  person, place, time, and situation  Attention Span and Concentration:  Able to attend to the interview      Language: intact     Recent and Remote Memory:  Intact to interview. Not formally assessed. No amnesia.    Fund of Knowledge: appropriate        SAFETY   Feels safe in home: YES     Suicidal ideation: Denies  History of suicide attempts:  No   Hx of impulsivity: No   Hope for the future: present    Hx of Command hallucinations or current psychosis: None endorsed    History of Self-injurious behaviors:    Family member  by suicide:  not discussed       SAFETY ASSESSMENT:   Based on all available evidence including the factors cited above, overall Risk for harm is low and is appropriate for outpatient level of care.   Recommended that patient call 911 or go to the local ED should there be a change in any of these risk factors.       DSM 5 DIAGNOSIS:   296.32 (F33.1) Major Depressive Disorder, Recurrent Episode, Moderate _  300.02 (F41.1) Generalized Anxiety Disorder      MEDICAL COMORBIDITY IMPACTING CLINICAL PICTURE: None noted.   Known issue that I take into account for their medical decisions, no current exacerbations or new concerns         ASSESSMENT AND PLAN        Problem List Items Addressed This Visit          Behavioral     Psychiatrically stable at present. Will return to PCP for ongoing care, medication prescribing and future medication refills.   6 months of medications fills provided.  Follow up with your PCP in about 6 months. Patient can be re-referred back to this service for further consultation in the future if needed but a new referral will need to be placed.              Moderate episode of recurrent major depressive disorder (H)    Relevant Medications    desvenlafaxine (PRISTIQ) 50 MG 24 hr tablet    desvenlafaxine succinate (PRISTIQ) 25 MG 24 hr tablet        CONSULTS/REFERRALS:   Continue therapy   Coordinate care with therapist as needed     MEDICAL:   None at this time  Coordinate care with PCP (Francisca Mendez) as needed  Follow up with primary care provider as planned or for acute medical concerns.     PSYCHOEDUCATION:  Medication side effects and alternatives reviewed. Health promotion activities recommended and reviewed today. All questions addressed. Education and counseling completed regarding risks and benefits of medications and psychotherapy options.  Consent provided by patient/guardian  Call the psychiatric nurse line with medication questions or concerns at [phone].  [place] may be used to communicate with your provider, but this is not intended to be used for emergencies.  Medlineplus.gov is information for patients.  It is run by the National Library of Medicine and it contains information about all disorders, diseases and all medications.       COMMUNITY RESOURCES:    CRISIS NUMBERS: Provided in AVS 10/12/2022  National Suicide Prevention Lifeline: 3-213-701-TALK (423-350-7937)  [employer]/resources for a list of additional resources (SOS)            [facility] - [phone]    Urgent Care Adult Mental Agoicw-[phone] mobile unit/ 24/7 crisis line  [facility] -[phone]   COPE 24/7 [place] Mobile Team -[phone] (adults)/ [phone] (child)  Poison Control Center - [phone]    OR  go to nearest ER  Crisis Text Line for any crisis 24/7 send this-   To: 242719   [facility] ([facility]) [facility] [place] [facility]  [phone]  National Suicide Prevention Lifeline: 292.464.4607 (TTY: 428.579.4992). Call anytime for help.  (www.suicidepreventionlifeline.org)  National [place] [employer] Mental Illness (www.[place].org): 768.511.1429 or 300-723-1377.   Mental Health Association (www.mentalhealth.org): 801.851.2254 or 528-370-6052.  [place] Crisis Text Line: Text [place] to 258099  Suicide LifeLine Chat: suicideProlifiq Softwareline.org/chat         ADMINISTRATIVE BILLING:   Level of Medical Decision Making:   - At least 2 stable chronic problems  - Engaged in prescription drug management during visit (discussed any medication benefits, side effects, alternatives, etc.)            Patient Status:  Patient has been stabilized and will be returned to PCP for ongoing care, medication prescribing and future medication refills.    3 months of medications fills provided.  Follow up with @PCP@ in about   3 months. Patient can be re-referred back to this service for further consultation in the future if needed but a new referral will need to be placed.     Signed:   Jami Lee, MSN, APRN, FMHNP-[facility] ([facility])   Chart documentation done in part with Dragon Voice Recognition software.  Although reviewed after completion, some word and grammatical errors may remain.

## 2023-09-06 NOTE — NURSING NOTE
Is the patient currently in the state of [place]? YES    Visit mode:VIDEO    If the visit is dropped, the patient can be reconnected by: VIDEO VISIT: Text to cell phone:   Telephone Information:   Mobile [phone]       Will anyone else be joining the visit? NO  (If patient encounters technical issues they should call [phone]    How would you like to obtain your AVS? MyChart    Are changes needed to the allergy or medication list? Pt stated no changes to allergies and Pt stated no med changes    Reason for visit: BRITTNEY STRAUSS

## 2023-09-06 NOTE — ASSESSMENT & PLAN NOTE
Psychiatrically stable at present. Will return to PCP for ongoing care, medication prescribing and future medication refills.   6 months of medications fills provided.  Follow up with your PCP in about 6 months. Patient can be re-referred back to this service for further consultation in the future if needed but a new referral will need to be placed.

## 2023-09-17 ENCOUNTER — MYC MEDICAL ADVICE (OUTPATIENT)
Dept: PSYCHIATRY | Facility: CLINIC | Age: 23
End: 2023-09-17
Payer: COMMERCIAL

## 2023-09-18 NOTE — TELEPHONE ENCOUNTER
"RN received and reviewed patient MyC message regarding Pristiq 50 mg and 25 mg dosing.    RN called [facility] pharmacy at [phone] to investigate if they     1. Received both prescriptions to which pharmacy member said, \"Yes we have both.\"  2. Pharmacy staff member indicated that she could run the 25 mg dose through insurance, but then started having technical difficulties with her computer system and could not run the 50 mg dose at the time of the call.   She said she will call or fax information if the 50 mg dose dose not to go through insurance.      RN called the patient to let her know of what was found out at the pharmacy.  Patient verbalized understanding and if she had any questions or found out more information to call us at [phone] or send a MyC message.    Oneyda Colorado RN on 9/18/2023 at 12:59 PM        "

## 2023-09-21 NOTE — TELEPHONE ENCOUNTER
Spoke to the pharmacy again and they said they don't have the pristiq 25 mg in stock so they had to order it. The 50 mg went through insurance just fine.   Sent message to patient explaining the situation.     Taya Ridley RN on 9/21/2023 at 12:36 PM

## 2023-10-10 ENCOUNTER — OFFICE VISIT (OUTPATIENT)
Dept: OBGYN | Facility: CLINIC | Age: 23
End: 2023-10-10
Payer: COMMERCIAL

## 2023-10-10 VITALS
SYSTOLIC BLOOD PRESSURE: 114 MMHG | WEIGHT: 189 LBS | HEIGHT: 71 IN | DIASTOLIC BLOOD PRESSURE: 71 MMHG | BODY MASS INDEX: 26.46 KG/M2 | OXYGEN SATURATION: 96 % | HEART RATE: 56 BPM

## 2023-10-10 DIAGNOSIS — Z86.018 HISTORY OF PROLACTINOMA: ICD-10-CM

## 2023-10-10 DIAGNOSIS — N92.6 IRREGULAR PERIODS: Primary | ICD-10-CM

## 2023-10-10 LAB — HBA1C MFR BLD: 4.8 % (ref 0–5.6)

## 2023-10-10 PROCEDURE — 84403 ASSAY OF TOTAL TESTOSTERONE: CPT | Performed by: OBSTETRICS & GYNECOLOGY

## 2023-10-10 PROCEDURE — 83036 HEMOGLOBIN GLYCOSYLATED A1C: CPT | Performed by: OBSTETRICS & GYNECOLOGY

## 2023-10-10 PROCEDURE — 99204 OFFICE O/P NEW MOD 45 MIN: CPT | Performed by: OBSTETRICS & GYNECOLOGY

## 2023-10-10 PROCEDURE — 82627 DEHYDROEPIANDROSTERONE: CPT | Performed by: OBSTETRICS & GYNECOLOGY

## 2023-10-10 PROCEDURE — 36415 COLL VENOUS BLD VENIPUNCTURE: CPT | Performed by: OBSTETRICS & GYNECOLOGY

## 2023-10-10 PROCEDURE — 84443 ASSAY THYROID STIM HORMONE: CPT | Performed by: OBSTETRICS & GYNECOLOGY

## 2023-10-10 PROCEDURE — 84270 ASSAY OF SEX HORMONE GLOBUL: CPT | Performed by: OBSTETRICS & GYNECOLOGY

## 2023-10-10 PROCEDURE — 84146 ASSAY OF PROLACTIN: CPT | Performed by: OBSTETRICS & GYNECOLOGY

## 2023-10-10 NOTE — PROGRESS NOTES
"[facility]- OBGYN    CC:painful, irregular periods    S:Maria Antonia Marr is a 23 year old  who presents today for painful and irregular periods.  Patient reports she started her period at age 12.  She always had an irregular period and sometimes would only get it once or twice in a year.  Then at age 18 she was started on an OCP.  She continued to have irregular periods and switched to the Mirena IUD at age 20.  Her period stopped, but she would continue to have some intermittent cramping.  She had the IUD removed at age 21 and has been on nothing since.      Her periods are irregular, occurring every 1.5-2 months.  They are very painful and crampy when they occur.  Typically they last for 5-7 days and are heavy.  She reports a history of acne along her chin and dark coarse hair on her lower abdomen. She reports difficulty losing weight and fatigue.    She has previously had care at [facility].  Unable to access records due to Care Everywhere query denial.  She reports she was diagnosed with elevated prolactin and had a brain scan showing pituitary prolactinoma.  She was treated with \"estrogen patch\" and had her prolactin checked q4 months and brain scanned q6 months.  She has not had either of these checked lately.  She does not think she has ever had a pelvic ultrasound.  She states she has not previously been diagnosed with PCOS.  Reports that she has family members with PCOS, endometriosis, and thyroid disease.     Patient denies any vaginal discharge, vaginal irritation, or current pelvic pain.    OBGYN Hx:     Patient's last menstrual period was 2023.  Menses:see HPI  Sexually active with one female partner  STD Hx:none  Pap hx:Reports NIL in     PMH:   Past Medical History:   Diagnosis Date    Anxiety     Depression     Prolactinoma (H)    Denies any history of HTN, VTE, or migraine with aura    PSH:  Past Surgical History:   Procedure Laterality Date    TONSILLECTOMY   " "      Meds:desvenlafaxine, folic acid  Allergies: seasonal  SH:Denies any tobacco or drug use.  Consumes 2-3 drinks per week  FH:I have reviewed this patient's family history and updated it with pertinent information if needed.  Family History   Problem Relation Age of Onset    Hypertension Mother     Ovarian cysts Mother         hysterectomy done     Maturity Onset Diabetes Mellitus in [place] ([place]) Sister        O: Patient Vitals for the past 24 hrs:   BP Pulse SpO2 Height Weight   10/10/23 1439 114/71 56 96 % 1.803 m (5' 11\") 85.7 kg (189 lb)   ]  Exam:  General- awake, alert, answering questions appropriately, appears comfortable  CV- regular rate   Lung-breathing comfortably on room air  - normal appearing external genitalia, normal appearing vaginal mucosa, normal appearing cervix, small amount of thin white vaginal discharge.  On bimanual exam 6 week sized mobile uterus, non-tender, no cervical motion tenderness, no adnexal masses palpated    A&P: Maria Antonia Marr is a 23 year old  who presents today for painful and irregular periods.      (N92.6) Irregular periods  (primary encounter diagnosis)  Comment: Reviewed with patient potential causes of irregular, painful, and heavy periods.  Discussed endocrine evaluation with thyroid testing.  Explained criteria for diagnosis of PCOS.  Patient does report physical findings that could be indicative of hyperandrogenism such as acnes and hirsutism.  Will eval testosterone levels.  She also reports previously elevated DHEAS.  She has irregular periods.  Will get pelvic ultrasound to eval for polycystic appearing ovaries.  Given patient's heavy period will also eval for uterine fibroids or polyps contributing.  With pain during periods will eval for ovarian cysts, such as endometriomas.  Discussed with patient definitive diagnosis of endometriosis is made with tissue sampling during diagnostic laparoscopy.  Unless endometrioma seen on ultrasound, do not need to " proceed with this step since treatment would be the same.  For treatment of heavy, painful, and irregular periods discussed birth control pills, depo provera, or Mirena IUD.    Plan: TSH with free T4 reflex, DHEA sulfate,         Testosterone Free and Total, Hemoglobin A1c, US        Transvaginal Pelvic Non-OB  Follow up visit to review results and next steps.  For pain recommend ibuprofen 600mg q6 hours scheduled during period    (Z86.018) History of prolactinoma  Comment: Patient with history of prolactinoma and elevated prolactin.  Could also be contributing to her irregular periods  Plan: Prolactin  If elevated plan for referral to endocrinology.           Yun Prakash MD

## 2023-10-11 LAB
DHEA-S SERPL-MCNC: 246 UG/DL (ref 35–430)
PROLACTIN SERPL 3RD IS-MCNC: 15 NG/ML (ref 5–23)
SHBG SERPL-SCNC: 29 NMOL/L (ref 30–135)
TSH SERPL DL<=0.005 MIU/L-ACNC: 1.63 UIU/ML (ref 0.3–4.2)

## 2023-10-13 LAB
TESTOST FREE SERPL-MCNC: 0.54 NG/DL
TESTOST SERPL-MCNC: 28 NG/DL (ref 8–60)

## 2024-02-24 ENCOUNTER — HEALTH MAINTENANCE LETTER (OUTPATIENT)
Age: 24
End: 2024-02-24

## 2025-03-09 ENCOUNTER — HEALTH MAINTENANCE LETTER (OUTPATIENT)
Age: 25
End: 2025-03-09

## 2025-04-15 ENCOUNTER — LAB REQUISITION (OUTPATIENT)
Dept: LAB | Facility: CLINIC | Age: 25
End: 2025-04-15

## 2025-04-15 DIAGNOSIS — R35.0 FREQUENCY OF MICTURITION: ICD-10-CM

## 2025-04-17 LAB — BACTERIA UR CULT: NORMAL

## 2025-07-25 ENCOUNTER — LAB REQUISITION (OUTPATIENT)
Dept: LAB | Facility: CLINIC | Age: 25
End: 2025-07-25
Payer: COMMERCIAL

## 2025-07-25 DIAGNOSIS — Z12.4 ENCOUNTER FOR SCREENING FOR MALIGNANT NEOPLASM OF CERVIX: ICD-10-CM

## 2025-07-25 PROCEDURE — G0145 SCR C/V CYTO,THINLAYER,RESCR: HCPCS | Mod: ORL | Performed by: OBSTETRICS & GYNECOLOGY

## 2025-07-30 LAB
BKR LAB AP GYN ADEQUACY: NORMAL
BKR LAB AP GYN INTERPRETATION: NORMAL
BKR LAB AP HPV REFLEX: NORMAL
BKR LAB AP LMP: NORMAL
BKR LAB AP PREVIOUS ABNL DX: NORMAL
BKR LAB AP PREVIOUS ABNORMAL: NORMAL
PATH REPORT.COMMENTS IMP SPEC: NORMAL
PATH REPORT.COMMENTS IMP SPEC: NORMAL
PATH REPORT.RELEVANT HX SPEC: NORMAL